# Patient Record
Sex: FEMALE | Race: WHITE | Employment: UNEMPLOYED | URBAN - METROPOLITAN AREA
[De-identification: names, ages, dates, MRNs, and addresses within clinical notes are randomized per-mention and may not be internally consistent; named-entity substitution may affect disease eponyms.]

---

## 2017-04-21 ENCOUNTER — OFFICE VISIT (OUTPATIENT)
Dept: INTERNAL MEDICINE CLINIC | Facility: CLINIC | Age: 57
End: 2017-04-21

## 2017-04-21 VITALS
SYSTOLIC BLOOD PRESSURE: 126 MMHG | HEART RATE: 86 BPM | HEIGHT: 61.25 IN | DIASTOLIC BLOOD PRESSURE: 80 MMHG | TEMPERATURE: 98 F | BODY MASS INDEX: 40.89 KG/M2 | OXYGEN SATURATION: 99 % | RESPIRATION RATE: 16 BRPM | WEIGHT: 219.38 LBS

## 2017-04-21 DIAGNOSIS — Z12.11 COLON CANCER SCREENING: ICD-10-CM

## 2017-04-21 DIAGNOSIS — Z00.00 ROUTINE GENERAL MEDICAL EXAMINATION AT A HEALTH CARE FACILITY: Primary | ICD-10-CM

## 2017-04-21 DIAGNOSIS — Z12.39 BREAST CANCER SCREENING: ICD-10-CM

## 2017-04-21 PROCEDURE — 99396 PREV VISIT EST AGE 40-64: CPT | Performed by: INTERNAL MEDICINE

## 2017-04-21 NOTE — PROGRESS NOTES
HPI:   Jessy Richardson is a 64year old female who presents for a complete physical exam. She had lost 20lb then re-gained it, mad at herself  Uses ww   Wt Readings from Last 6 Encounters:  04/21/17 : 219 lb 6.4 oz  12/31/15 : 221 lb 3.2 oz  06/02/15 : 220 Pulse 86  Temp(Src) 97.9 °F (36.6 °C) (Oral)  Resp 16  Ht 61.25\"  Wt 219 lb 6.4 oz  BMI 41.10 kg/m2  SpO2 99%  LMP 03/17/2017 (Exact Date)  Body mass index is 41.1 kg/(m^2).    GENERAL: well developed, well nourished,in no apparent distress  SKIN: no rashe Follow-up on file.

## 2017-04-28 ENCOUNTER — LAB ENCOUNTER (OUTPATIENT)
Dept: LAB | Age: 57
End: 2017-04-28
Attending: INTERNAL MEDICINE
Payer: COMMERCIAL

## 2017-04-28 DIAGNOSIS — Z00.00 ROUTINE GENERAL MEDICAL EXAMINATION AT A HEALTH CARE FACILITY: ICD-10-CM

## 2017-04-28 DIAGNOSIS — E78.00 HYPERCHOLESTEREMIA: Primary | ICD-10-CM

## 2017-04-28 PROCEDURE — 80061 LIPID PANEL: CPT

## 2017-04-28 PROCEDURE — 80053 COMPREHEN METABOLIC PANEL: CPT

## 2017-04-28 PROCEDURE — 36415 COLL VENOUS BLD VENIPUNCTURE: CPT

## 2017-04-28 PROCEDURE — 84443 ASSAY THYROID STIM HORMONE: CPT

## 2017-04-28 PROCEDURE — 85025 COMPLETE CBC W/AUTO DIFF WBC: CPT

## 2017-05-09 ENCOUNTER — HOSPITAL ENCOUNTER (OUTPATIENT)
Dept: MAMMOGRAPHY | Age: 57
Discharge: HOME | End: 2017-05-09
Attending: INTERNAL MEDICINE
Payer: COMMERCIAL

## 2017-05-09 DIAGNOSIS — Z12.39 BREAST CANCER SCREENING: ICD-10-CM

## 2017-05-09 PROCEDURE — 77067 SCR MAMMO BI INCL CAD: CPT | Performed by: INTERNAL MEDICINE

## 2018-05-11 ENCOUNTER — OFFICE VISIT (OUTPATIENT)
Dept: FAMILY MEDICINE CLINIC | Facility: CLINIC | Age: 58
End: 2018-05-11

## 2018-05-11 VITALS
DIASTOLIC BLOOD PRESSURE: 88 MMHG | HEART RATE: 81 BPM | SYSTOLIC BLOOD PRESSURE: 138 MMHG | HEIGHT: 62 IN | OXYGEN SATURATION: 96 % | BODY MASS INDEX: 41.07 KG/M2 | TEMPERATURE: 99 F | WEIGHT: 223.19 LBS

## 2018-05-11 DIAGNOSIS — H10.31 ACUTE CONJUNCTIVITIS OF RIGHT EYE, UNSPECIFIED ACUTE CONJUNCTIVITIS TYPE: Primary | ICD-10-CM

## 2018-05-11 PROCEDURE — 99213 OFFICE O/P EST LOW 20 MIN: CPT | Performed by: NURSE PRACTITIONER

## 2018-05-11 RX ORDER — POLYMYXIN B SULFATE AND TRIMETHOPRIM 1; 10000 MG/ML; [USP'U]/ML
1 SOLUTION OPHTHALMIC EVERY 4 HOURS
Qty: 1 BOTTLE | Refills: 0 | Status: SHIPPED | OUTPATIENT
Start: 2018-05-11 | End: 2018-05-18

## 2018-05-11 NOTE — PATIENT INSTRUCTIONS
Understanding Red Eye: Causes    Do your eyes sometimes get red and irritated? This could be a sign of irritation or infection. The inside of your eyelid and the white of your eye are covered with a membrane called the conjunctiva.  When your eye is irrit

## 2018-05-11 NOTE — PROGRESS NOTES
CHIEF COMPLAINT:   Patient presents with:  Eye Problem: right eye irritation,redness, pressure, pain x3days      HPI:   Sesar Nicholson is a 62year old female who presents with chief complaint of \"pink eye\". Symptoms began  3  days ago.  Symptoms have be Problem Relation Age of Onset   • Heart Attack Father      52's   • Heart Attack Brother 36   • Glaucoma Maternal Grandmother    • Other[Other] [OTHER] Maternal Grandmother    • Glaucoma Maternal Uncle    • High Blood Pressure Mother       Smoking status: PLAN: Hygeine and comfort care as listen in patient instructions. Irritation of eye vs. Conjunctivitis. Medication as listed below. If any vision changes or eye pain seek emergent care. Follow up with Eye Dr.  in 2-3 days if no improvement.  Advised arabella

## 2019-05-13 ENCOUNTER — OFFICE VISIT (OUTPATIENT)
Dept: INTERNAL MEDICINE CLINIC | Facility: CLINIC | Age: 59
End: 2019-05-13
Payer: COMMERCIAL

## 2019-05-13 VITALS
DIASTOLIC BLOOD PRESSURE: 80 MMHG | TEMPERATURE: 98 F | RESPIRATION RATE: 14 BRPM | HEART RATE: 87 BPM | SYSTOLIC BLOOD PRESSURE: 144 MMHG | OXYGEN SATURATION: 96 % | WEIGHT: 232.69 LBS | HEIGHT: 62 IN | BODY MASS INDEX: 42.82 KG/M2

## 2019-05-13 DIAGNOSIS — D22.9 NUMEROUS MOLES: ICD-10-CM

## 2019-05-13 DIAGNOSIS — R73.02 IGT (IMPAIRED GLUCOSE TOLERANCE): ICD-10-CM

## 2019-05-13 DIAGNOSIS — N90.89 VULVAR LESION: ICD-10-CM

## 2019-05-13 DIAGNOSIS — E78.00 PURE HYPERCHOLESTEROLEMIA: ICD-10-CM

## 2019-05-13 DIAGNOSIS — E66.01 CLASS 3 SEVERE OBESITY DUE TO EXCESS CALORIES WITHOUT SERIOUS COMORBIDITY WITH BODY MASS INDEX (BMI) OF 40.0 TO 44.9 IN ADULT (HCC): ICD-10-CM

## 2019-05-13 DIAGNOSIS — Z12.11 COLON CANCER SCREENING: ICD-10-CM

## 2019-05-13 DIAGNOSIS — E88.81 METABOLIC SYNDROME: ICD-10-CM

## 2019-05-13 DIAGNOSIS — Z00.00 ROUTINE GENERAL MEDICAL EXAMINATION AT A HEALTH CARE FACILITY: Primary | ICD-10-CM

## 2019-05-13 DIAGNOSIS — Z12.31 ENCOUNTER FOR SCREENING MAMMOGRAM FOR BREAST CANCER: ICD-10-CM

## 2019-05-13 PROCEDURE — 99213 OFFICE O/P EST LOW 20 MIN: CPT | Performed by: INTERNAL MEDICINE

## 2019-05-13 PROCEDURE — 99396 PREV VISIT EST AGE 40-64: CPT | Performed by: INTERNAL MEDICINE

## 2019-05-13 NOTE — PROGRESS NOTES
HPI:   Chris Roland is a 62year old female who presents for a complete physical exam.  She has gained more wt and is high risk for DM.  No PU/PD  Wt Readings from Last 6 Encounters:  05/13/19 : 232 lb 11.2 oz  05/11/18 : 223 lb 3.2 oz  04/21/17 : 219 lb depression or anxiety  HEMATOLOGIC: denies hx of anemia  ENDOCRINE: denies thyroid history  ALL/ASTHMA: denies hx of allergy or asthma    EXAM:   /80 (BP Location: Left arm, Patient Position: Sitting, Cuff Size: large)   Pulse 87   Temp 98.1 °F (36.7 one, will recheck  Igt (impaired glucose tolerance)- she is very high risk for DM, check labs  Metabolic syndrome \"  Class 3 severe obesity due to excess calories without serious comorbidity with body mass index (bmi) of 40.0 to 44.9 in adult (hcc)- refer

## 2019-05-15 ENCOUNTER — LAB ENCOUNTER (OUTPATIENT)
Dept: LAB | Age: 59
End: 2019-05-15
Attending: INTERNAL MEDICINE
Payer: COMMERCIAL

## 2019-05-15 DIAGNOSIS — Z00.00 ROUTINE GENERAL MEDICAL EXAMINATION AT A HEALTH CARE FACILITY: ICD-10-CM

## 2019-05-15 PROCEDURE — 80053 COMPREHEN METABOLIC PANEL: CPT

## 2019-05-15 PROCEDURE — 83036 HEMOGLOBIN GLYCOSYLATED A1C: CPT

## 2019-05-15 PROCEDURE — 84443 ASSAY THYROID STIM HORMONE: CPT

## 2019-05-15 PROCEDURE — 82306 VITAMIN D 25 HYDROXY: CPT

## 2019-05-15 PROCEDURE — 80061 LIPID PANEL: CPT

## 2019-05-15 PROCEDURE — 36415 COLL VENOUS BLD VENIPUNCTURE: CPT

## 2019-05-16 NOTE — PROGRESS NOTES
Spoke to pt, aware of results & recommendations. Pt voiced understanding. Vit D, DM labs & FLP ordered in 3 months.

## 2019-05-20 ENCOUNTER — HOSPITAL ENCOUNTER (OUTPATIENT)
Dept: MAMMOGRAPHY | Age: 59
Discharge: HOME | End: 2019-05-20
Attending: INTERNAL MEDICINE
Payer: COMMERCIAL

## 2019-05-20 DIAGNOSIS — Z12.31 ENCOUNTER FOR SCREENING MAMMOGRAM FOR BREAST CANCER: ICD-10-CM

## 2019-05-20 PROCEDURE — 77067 SCR MAMMO BI INCL CAD: CPT | Performed by: INTERNAL MEDICINE

## 2019-05-20 PROCEDURE — 77063 BREAST TOMOSYNTHESIS BI: CPT | Performed by: INTERNAL MEDICINE

## 2019-05-29 ENCOUNTER — OFFICE VISIT (OUTPATIENT)
Dept: OBGYN CLINIC | Facility: CLINIC | Age: 59
End: 2019-05-29
Payer: COMMERCIAL

## 2019-05-29 VITALS
WEIGHT: 229 LBS | SYSTOLIC BLOOD PRESSURE: 124 MMHG | BODY MASS INDEX: 42.14 KG/M2 | HEART RATE: 80 BPM | HEIGHT: 62 IN | DIASTOLIC BLOOD PRESSURE: 84 MMHG | TEMPERATURE: 99 F | RESPIRATION RATE: 14 BRPM

## 2019-05-29 DIAGNOSIS — N90.89 VULVAL LESION: Primary | ICD-10-CM

## 2019-05-29 DIAGNOSIS — L29.2 VULVAR ITCHING: ICD-10-CM

## 2019-05-29 PROCEDURE — 99242 OFF/OP CONSLTJ NEW/EST SF 20: CPT | Performed by: OBSTETRICS & GYNECOLOGY

## 2019-05-29 NOTE — PROGRESS NOTES
HPI:   Torsten Dudley is a 62year old female presents for consultation regarding persistent irritation and itching on the left side of her vulvar near that perianal area.   She also has a nodule on the upper part of her left vulva which she states is ir Grandmother    • High Blood Pressure Mother    • Heart Attack Brother 36   • Glaucoma Maternal Uncle         REVIEW OF SYSTEMS:   GENERAL HEALTH: feels well, denies fever/chills, lightheadedness/dizziness  SKIN: denies any unusual skin lesions or rashes  H

## 2019-06-07 ENCOUNTER — OFFICE VISIT (OUTPATIENT)
Dept: OBGYN CLINIC | Facility: CLINIC | Age: 59
End: 2019-06-07
Payer: COMMERCIAL

## 2019-06-07 VITALS
DIASTOLIC BLOOD PRESSURE: 70 MMHG | RESPIRATION RATE: 12 BRPM | WEIGHT: 226 LBS | HEIGHT: 62 IN | HEART RATE: 80 BPM | TEMPERATURE: 98 F | SYSTOLIC BLOOD PRESSURE: 110 MMHG | BODY MASS INDEX: 41.59 KG/M2

## 2019-06-07 DIAGNOSIS — N90.89 VULVAL LESION: ICD-10-CM

## 2019-06-07 PROCEDURE — 11420 EXC H-F-NK-SP B9+MARG 0.5/<: CPT | Performed by: OBSTETRICS & GYNECOLOGY

## 2019-06-07 PROCEDURE — 88304 TISSUE EXAM BY PATHOLOGIST: CPT | Performed by: OBSTETRICS & GYNECOLOGY

## 2019-06-07 NOTE — PROGRESS NOTES
Lesion Removal of the left vulva    DIAGNOSIS:   Left vulvar mass    HPI:   62year old  No LMP recorded. (Menstrual status: Menopause). with .  Here for excision of a mass on the left vulva most likely a sebaceous cyst.  She was initially also schedule

## 2019-08-16 NOTE — PROGRESS NOTES
Spoke to pt, reminded to have labs and to schedule OV. Pt states she will do in next week or 2 and will call back to schedule OV.

## 2019-08-23 NOTE — PROGRESS NOTES
Spoke to pt, reminded to have labs done & to schedule OV. Pt states will do it in the next week & will call then to schedule OV. Pt states, \"I have a lot going on right now. \"

## 2019-09-04 ENCOUNTER — APPOINTMENT (OUTPATIENT)
Dept: LAB | Age: 59
End: 2019-09-04
Attending: INTERNAL MEDICINE
Payer: COMMERCIAL

## 2019-09-04 DIAGNOSIS — Z00.00 ROUTINE GENERAL MEDICAL EXAMINATION AT A HEALTH CARE FACILITY: ICD-10-CM

## 2019-09-04 DIAGNOSIS — E55.9 VITAMIN D DEFICIENCY: ICD-10-CM

## 2019-09-04 DIAGNOSIS — E78.00 PURE HYPERCHOLESTEROLEMIA: ICD-10-CM

## 2019-09-04 LAB
ALBUMIN SERPL-MCNC: 3.5 G/DL (ref 3.4–5)
ALBUMIN/GLOB SERPL: 0.9 {RATIO} (ref 1–2)
ALP LIVER SERPL-CCNC: 98 U/L (ref 46–118)
ALT SERPL-CCNC: 24 U/L (ref 13–56)
ANION GAP SERPL CALC-SCNC: 7 MMOL/L (ref 0–18)
AST SERPL-CCNC: 18 U/L (ref 15–37)
BILIRUB SERPL-MCNC: 0.8 MG/DL (ref 0.1–2)
BUN BLD-MCNC: 13 MG/DL (ref 7–18)
BUN/CREAT SERPL: 21 (ref 10–20)
CALCIUM BLD-MCNC: 8.2 MG/DL (ref 8.5–10.1)
CHLORIDE SERPL-SCNC: 107 MMOL/L (ref 98–112)
CHOLEST SMN-MCNC: 261 MG/DL (ref ?–200)
CO2 SERPL-SCNC: 27 MMOL/L (ref 21–32)
CREAT BLD-MCNC: 0.62 MG/DL (ref 0.55–1.02)
CREAT UR-SCNC: 105 MG/DL
EST. AVERAGE GLUCOSE BLD GHB EST-MCNC: 131 MG/DL (ref 68–126)
GLOBULIN PLAS-MCNC: 4 G/DL (ref 2.8–4.4)
GLUCOSE BLD-MCNC: 110 MG/DL (ref 70–99)
HBA1C MFR BLD HPLC: 6.2 % (ref ?–5.7)
HDLC SERPL-MCNC: 57 MG/DL (ref 40–59)
LDLC SERPL CALC-MCNC: 174 MG/DL (ref ?–100)
M PROTEIN MFR SERPL ELPH: 7.5 G/DL (ref 6.4–8.2)
MICROALBUMIN UR-MCNC: 1.31 MG/DL
MICROALBUMIN/CREAT 24H UR-RTO: 12.5 UG/MG (ref ?–30)
NONHDLC SERPL-MCNC: 204 MG/DL (ref ?–130)
OSMOLALITY SERPL CALC.SUM OF ELEC: 293 MOSM/KG (ref 275–295)
POTASSIUM SERPL-SCNC: 4.1 MMOL/L (ref 3.5–5.1)
SODIUM SERPL-SCNC: 141 MMOL/L (ref 136–145)
TRIGL SERPL-MCNC: 151 MG/DL (ref 30–149)
VIT D+METAB SERPL-MCNC: 15.4 NG/ML (ref 30–100)
VLDLC SERPL CALC-MCNC: 30 MG/DL (ref 0–30)

## 2019-09-04 PROCEDURE — 82043 UR ALBUMIN QUANTITATIVE: CPT

## 2019-09-04 PROCEDURE — 83036 HEMOGLOBIN GLYCOSYLATED A1C: CPT

## 2019-09-04 PROCEDURE — 82306 VITAMIN D 25 HYDROXY: CPT

## 2019-09-04 PROCEDURE — 82570 ASSAY OF URINE CREATININE: CPT

## 2019-09-04 PROCEDURE — 36415 COLL VENOUS BLD VENIPUNCTURE: CPT

## 2019-09-04 PROCEDURE — 80061 LIPID PANEL: CPT

## 2019-09-04 PROCEDURE — 80053 COMPREHEN METABOLIC PANEL: CPT

## 2019-09-09 PROBLEM — Z12.11 SPECIAL SCREENING FOR MALIGNANT NEOPLASMS, COLON: Status: ACTIVE | Noted: 2019-09-09

## 2019-09-09 PROBLEM — K63.5 COLON POLYP: Status: ACTIVE | Noted: 2019-09-09

## 2019-09-12 ENCOUNTER — OFFICE VISIT (OUTPATIENT)
Dept: INTERNAL MEDICINE CLINIC | Facility: CLINIC | Age: 59
End: 2019-09-12
Payer: COMMERCIAL

## 2019-09-12 VITALS
HEART RATE: 86 BPM | HEIGHT: 62 IN | TEMPERATURE: 98 F | WEIGHT: 222.88 LBS | DIASTOLIC BLOOD PRESSURE: 78 MMHG | OXYGEN SATURATION: 97 % | RESPIRATION RATE: 16 BRPM | BODY MASS INDEX: 41.02 KG/M2 | SYSTOLIC BLOOD PRESSURE: 142 MMHG

## 2019-09-12 DIAGNOSIS — E78.00 PURE HYPERCHOLESTEROLEMIA: ICD-10-CM

## 2019-09-12 DIAGNOSIS — N95.0 PMB (POSTMENOPAUSAL BLEEDING): ICD-10-CM

## 2019-09-12 DIAGNOSIS — G47.33 OBSTRUCTIVE SLEEP APNEA SYNDROME: ICD-10-CM

## 2019-09-12 DIAGNOSIS — R73.02 IGT (IMPAIRED GLUCOSE TOLERANCE): Primary | ICD-10-CM

## 2019-09-12 PROCEDURE — 99214 OFFICE O/P EST MOD 30 MIN: CPT | Performed by: INTERNAL MEDICINE

## 2019-09-12 RX ORDER — ATORVASTATIN CALCIUM 20 MG/1
20 TABLET, FILM COATED ORAL DAILY
Qty: 90 TABLET | Refills: 0 | Status: SHIPPED | OUTPATIENT
Start: 2019-09-12 | End: 2019-12-11

## 2019-09-12 RX ORDER — CHOLECALCIFEROL (VITAMIN D3) 125 MCG
1 CAPSULE ORAL DAILY
COMMUNITY
End: 2020-11-11 | Stop reason: DRUGHIGH

## 2019-09-12 NOTE — PROGRESS NOTES
Isaias Melchor is a 62year old female  Patient presents with:  Lab Results  Dizziness: Some since Colonoscopy on 9/9/2019  Sleep Problem      HPI:   She is here for IGT, hyperipidemia- she eliminated some white carbs   She repeated her labs last week placed or performed in visit on 09/04/19   MICROALB/CREAT RATIO, RANDOM URINE   Result Value Ref Range    Microalbumin, Urine 1.31 mg/dL    Creatinine Ur Random 105.00 mg/dL    Malb/Cre Calc 12.5 <=30.0 ug/mg   HEMOGLOBIN A1C   Result Value Ref Range    Hg Refills for this Visit:  Requested Prescriptions     Signed Prescriptions Disp Refills   • atorvastatin 20 MG Oral Tab 90 tablet 0     Sig: Take 1 tablet (20 mg total) by mouth daily.        Imaging & Consults:  OP REFERRAL TO DIAGNOSTIC SLEEP STUDY  OBG -

## 2019-09-12 NOTE — PATIENT INSTRUCTIONS
New medicine for Cholesterol: Statins: (pravastatin, rosovastatin, simvastatin, atorvastatin) please notify me if you experience any abd bloating, discomfort , tenderness  or loss of appetite. Please call if stools pale (jany colored) or urine dark.   Notif

## 2019-11-18 ENCOUNTER — IMMUNIZATION (OUTPATIENT)
Dept: INTERNAL MEDICINE CLINIC | Facility: CLINIC | Age: 59
End: 2019-11-18
Payer: COMMERCIAL

## 2019-11-18 DIAGNOSIS — Z23 NEED FOR VACCINATION: ICD-10-CM

## 2019-11-18 PROCEDURE — 90471 IMMUNIZATION ADMIN: CPT | Performed by: INTERNAL MEDICINE

## 2019-11-18 PROCEDURE — 90686 IIV4 VACC NO PRSV 0.5 ML IM: CPT | Performed by: INTERNAL MEDICINE

## 2019-12-06 NOTE — PROGRESS NOTES
Spoke to pt's spouse on HIPAA. Made aware of results & recommendations. Repeat labs ordered in 6 months. Spouse unaware if pt has scheduled sleep study yet. Advised to have pt call back for update. Spouse voiced understanding.

## 2019-12-11 DIAGNOSIS — E78.00 PURE HYPERCHOLESTEROLEMIA: Primary | ICD-10-CM

## 2019-12-12 RX ORDER — ATORVASTATIN CALCIUM 20 MG/1
20 TABLET, FILM COATED ORAL DAILY
Qty: 90 TABLET | Refills: 1 | Status: SHIPPED | OUTPATIENT
Start: 2019-12-12 | End: 2020-03-13

## 2019-12-12 NOTE — TELEPHONE ENCOUNTER
Per OV note 9/12/19, patient to F/U 3 months. Patient had labs drawn 12/4/19, and per result note, patient to F/U in 6 months. 6 month supply of atorvastatin sent to pharmacy until labs and follow up due.

## 2019-12-16 ENCOUNTER — OFFICE VISIT (OUTPATIENT)
Dept: INTERNAL MEDICINE CLINIC | Facility: CLINIC | Age: 59
End: 2019-12-16
Payer: COMMERCIAL

## 2019-12-16 ENCOUNTER — TELEPHONE (OUTPATIENT)
Dept: INTERNAL MEDICINE CLINIC | Facility: CLINIC | Age: 59
End: 2019-12-16

## 2019-12-16 ENCOUNTER — HOSPITAL ENCOUNTER (OUTPATIENT)
Dept: GENERAL RADIOLOGY | Age: 59
Discharge: HOME | End: 2019-12-16
Attending: INTERNAL MEDICINE
Payer: COMMERCIAL

## 2019-12-16 VITALS
TEMPERATURE: 98 F | WEIGHT: 218.5 LBS | SYSTOLIC BLOOD PRESSURE: 136 MMHG | HEART RATE: 81 BPM | HEIGHT: 62 IN | OXYGEN SATURATION: 98 % | RESPIRATION RATE: 16 BRPM | DIASTOLIC BLOOD PRESSURE: 82 MMHG | BODY MASS INDEX: 40.21 KG/M2

## 2019-12-16 DIAGNOSIS — R04.2 HEMOPTYSIS: ICD-10-CM

## 2019-12-16 DIAGNOSIS — J20.9 ACUTE BRONCHITIS WITH BRONCHOSPASM: Primary | ICD-10-CM

## 2019-12-16 DIAGNOSIS — J20.9 ACUTE BRONCHITIS WITH BRONCHOSPASM: ICD-10-CM

## 2019-12-16 PROCEDURE — 71046 X-RAY EXAM CHEST 2 VIEWS: CPT | Performed by: INTERNAL MEDICINE

## 2019-12-16 PROCEDURE — 99214 OFFICE O/P EST MOD 30 MIN: CPT | Performed by: INTERNAL MEDICINE

## 2019-12-16 RX ORDER — BUDESONIDE AND FORMOTEROL FUMARATE DIHYDRATE 160; 4.5 UG/1; UG/1
2 AEROSOL RESPIRATORY (INHALATION) 2 TIMES DAILY
Qty: 1 INHALER | Refills: 0 | COMMUNITY
Start: 2019-12-16 | End: 2020-10-30 | Stop reason: ALTCHOICE

## 2019-12-16 RX ORDER — ACETAMINOPHEN 500 MG
1000 TABLET ORAL EVERY 6 HOURS PRN
COMMUNITY
End: 2019-12-23

## 2019-12-16 RX ORDER — AZITHROMYCIN 250 MG/1
TABLET, FILM COATED ORAL
Qty: 6 TABLET | Refills: 0 | Status: SHIPPED | OUTPATIENT
Start: 2019-12-16 | End: 2019-12-23 | Stop reason: ALTCHOICE

## 2019-12-16 NOTE — TELEPHONE ENCOUNTER
Spoke to pt. Pt states nose has been running since Saturday. Then coughing up mucus, clear to darker as day went on. Took Benadryl to help with sneezing & runny nose. Sunday runny nose, coughing again, red spots in it  Denies any bloody nose.     States

## 2019-12-16 NOTE — PROGRESS NOTES
Karri Cochran is a 62year old female  Patient presents with:  Cough  Chest Congestion: Clear to Yellow Mucus - yesterday/today she noticed \"Red\" spots in mucus   Wheezing: \"Rumbling\" noise  Nasal Congestion      HPI:   Cough for 3 days worsening 1 w Colon polyp        REVIEW OF SYSTEMS:   GENERAL HEALTH: feels well otherwise      EXAM:   /82 (BP Location: Left arm, Patient Position: Sitting, Cuff Size: large)   Pulse 81   Temp 97.9 °F (36.6 °C) (Oral)   Resp 16   Ht 62\"   Wt 218 lb 8 oz (99.1

## 2019-12-16 NOTE — TELEPHONE ENCOUNTER
Patient called, last week she started to get a cold and cough. The patient stated to cough up a lot of mucus this weekend, and then she noticed there was \"red\" in it, which she thinks could be blood?   The patient is very winded as well, wanted apt today

## 2019-12-23 ENCOUNTER — OFFICE VISIT (OUTPATIENT)
Dept: INTERNAL MEDICINE CLINIC | Facility: CLINIC | Age: 59
End: 2019-12-23
Payer: COMMERCIAL

## 2019-12-23 VITALS
BODY MASS INDEX: 40.12 KG/M2 | SYSTOLIC BLOOD PRESSURE: 122 MMHG | TEMPERATURE: 98 F | DIASTOLIC BLOOD PRESSURE: 80 MMHG | HEIGHT: 62 IN | WEIGHT: 218 LBS | OXYGEN SATURATION: 98 % | HEART RATE: 80 BPM | RESPIRATION RATE: 14 BRPM

## 2019-12-23 DIAGNOSIS — R04.2 HEMOPTYSIS: ICD-10-CM

## 2019-12-23 DIAGNOSIS — B00.1 RECURRENT HERPES LABIALIS: ICD-10-CM

## 2019-12-23 DIAGNOSIS — J20.9 ACUTE BRONCHITIS WITH BRONCHOSPASM: Primary | ICD-10-CM

## 2019-12-23 PROCEDURE — 99213 OFFICE O/P EST LOW 20 MIN: CPT | Performed by: INTERNAL MEDICINE

## 2019-12-23 RX ORDER — VALACYCLOVIR HYDROCHLORIDE 1 G/1
2 TABLET, FILM COATED ORAL EVERY 12 HOURS SCHEDULED
Qty: 4 TABLET | Refills: 0 | Status: SHIPPED | OUTPATIENT
Start: 2019-12-23 | End: 2019-12-24

## 2019-12-23 NOTE — PROGRESS NOTES
Stefany Zendejas is a 62year old female. To F/U from last visit regarding acute bronchitis w/bronchospasm and hemoptosis    HPI:    Interim history:she is feeling so much better.  No further hemoptysis  She is only coughing a tiny pit  No wheezing or dyspne auscultation and percussion      Xr Chest Pa + Lat Chest (cpt=71046)    Result Date: 12/16/2019  PROCEDURE:  XR CHEST PA + LAT CHEST (CPT=71046)  INDICATIONS:  R04.2 Hemoptysis J20.9 Acute bronchitis, unspecified  COMPARISON:  None.   TECHNIQUE:  PA and lat

## 2020-06-05 DIAGNOSIS — E78.00 PURE HYPERCHOLESTEROLEMIA: ICD-10-CM

## 2020-06-05 RX ORDER — ATORVASTATIN CALCIUM 20 MG/1
TABLET, FILM COATED ORAL
Qty: 90 TABLET | Refills: 0 | Status: SHIPPED | OUTPATIENT
Start: 2020-06-05 | End: 2020-09-11

## 2020-08-27 ENCOUNTER — TELEPHONE (OUTPATIENT)
Dept: INTERNAL MEDICINE CLINIC | Facility: CLINIC | Age: 60
End: 2020-08-27

## 2020-08-27 DIAGNOSIS — Z12.31 ENCOUNTER FOR SCREENING MAMMOGRAM FOR BREAST CANCER: Primary | ICD-10-CM

## 2020-08-27 NOTE — TELEPHONE ENCOUNTER
Pt has a PE scheduled for 10/30 and Pt was requesting that she get an order for a Mammogram to be done before her appt.   Please advise  Thank you

## 2020-09-08 ENCOUNTER — HOSPITAL ENCOUNTER (OUTPATIENT)
Dept: MAMMOGRAPHY | Age: 60
Discharge: HOME | End: 2020-09-08
Attending: INTERNAL MEDICINE
Payer: COMMERCIAL

## 2020-09-08 DIAGNOSIS — Z12.31 ENCOUNTER FOR SCREENING MAMMOGRAM FOR BREAST CANCER: ICD-10-CM

## 2020-09-08 PROCEDURE — 77067 SCR MAMMO BI INCL CAD: CPT | Performed by: INTERNAL MEDICINE

## 2020-09-08 PROCEDURE — 77063 BREAST TOMOSYNTHESIS BI: CPT | Performed by: INTERNAL MEDICINE

## 2020-09-10 DIAGNOSIS — E78.00 PURE HYPERCHOLESTEROLEMIA: ICD-10-CM

## 2020-09-11 RX ORDER — ATORVASTATIN CALCIUM 20 MG/1
TABLET, FILM COATED ORAL
Qty: 90 TABLET | Refills: 0 | Status: SHIPPED | OUTPATIENT
Start: 2020-09-11 | End: 2020-12-15

## 2020-10-30 ENCOUNTER — OFFICE VISIT (OUTPATIENT)
Dept: INTERNAL MEDICINE CLINIC | Facility: CLINIC | Age: 60
End: 2020-10-30
Payer: COMMERCIAL

## 2020-10-30 VITALS
BODY MASS INDEX: 43.08 KG/M2 | HEART RATE: 90 BPM | TEMPERATURE: 97 F | HEIGHT: 62 IN | DIASTOLIC BLOOD PRESSURE: 70 MMHG | SYSTOLIC BLOOD PRESSURE: 122 MMHG | RESPIRATION RATE: 16 BRPM | WEIGHT: 234.13 LBS | OXYGEN SATURATION: 98 %

## 2020-10-30 DIAGNOSIS — Z12.31 BREAST CANCER SCREENING BY MAMMOGRAM: Primary | ICD-10-CM

## 2020-10-30 DIAGNOSIS — Z12.4 CERVICAL CANCER SCREENING: ICD-10-CM

## 2020-10-30 DIAGNOSIS — Z23 NEED FOR VACCINATION: ICD-10-CM

## 2020-10-30 DIAGNOSIS — E66.01 CLASS 3 SEVERE OBESITY DUE TO EXCESS CALORIES WITH SERIOUS COMORBIDITY AND BODY MASS INDEX (BMI) OF 40.0 TO 44.9 IN ADULT (HCC): ICD-10-CM

## 2020-10-30 DIAGNOSIS — E78.00 PURE HYPERCHOLESTEROLEMIA: ICD-10-CM

## 2020-10-30 DIAGNOSIS — G47.33 OBSTRUCTIVE SLEEP APNEA SYNDROME: ICD-10-CM

## 2020-10-30 DIAGNOSIS — R73.02 IGT (IMPAIRED GLUCOSE TOLERANCE): ICD-10-CM

## 2020-10-30 DIAGNOSIS — E88.81 METABOLIC SYNDROME: ICD-10-CM

## 2020-10-30 DIAGNOSIS — Z00.00 ROUTINE GENERAL MEDICAL EXAMINATION AT A HEALTH CARE FACILITY: ICD-10-CM

## 2020-10-30 PROBLEM — L29.2 VULVAR ITCHING: Status: RESOLVED | Noted: 2019-05-29 | Resolved: 2020-10-30

## 2020-10-30 PROBLEM — N90.89 VULVAL LESION: Status: RESOLVED | Noted: 2019-05-29 | Resolved: 2020-10-30

## 2020-10-30 PROCEDURE — 87624 HPV HI-RISK TYP POOLED RSLT: CPT | Performed by: INTERNAL MEDICINE

## 2020-10-30 PROCEDURE — 3078F DIAST BP <80 MM HG: CPT | Performed by: INTERNAL MEDICINE

## 2020-10-30 PROCEDURE — 3074F SYST BP LT 130 MM HG: CPT | Performed by: INTERNAL MEDICINE

## 2020-10-30 PROCEDURE — 3008F BODY MASS INDEX DOCD: CPT | Performed by: INTERNAL MEDICINE

## 2020-10-30 PROCEDURE — 88175 CYTOPATH C/V AUTO FLUID REDO: CPT | Performed by: INTERNAL MEDICINE

## 2020-10-30 PROCEDURE — 99214 OFFICE O/P EST MOD 30 MIN: CPT | Performed by: INTERNAL MEDICINE

## 2020-10-30 PROCEDURE — 90471 IMMUNIZATION ADMIN: CPT | Performed by: INTERNAL MEDICINE

## 2020-10-30 PROCEDURE — 99396 PREV VISIT EST AGE 40-64: CPT | Performed by: INTERNAL MEDICINE

## 2020-10-30 PROCEDURE — 90750 HZV VACC RECOMBINANT IM: CPT | Performed by: INTERNAL MEDICINE

## 2020-10-30 NOTE — PROGRESS NOTES
HPI:   Tiffany Alexander is a 61year old female who presents for a complete physical exam.  She is here for IGT, hyperipidemia-     Statin:denies jaundice, pale stools, dark urine, weight change, fatigue, abd pain, distention or DALE       She has very low D Alcohol/week: 0.0 - 0.8 standard drinks      Comment: Not often    Drug use: Never    Occ: homemaker. : y.  Children: 1 ,son at [employer] at home,   , Exercise: none currently but would like to return     REVIEW OF SYSTEMS:   GENERAL: feels well otherwi AND PLAN:   Marti Garcia is a 61year old female who presents for a complete physical exam.  breast and pelvic done. Order put in for mammogram .  Appropriate lab testing ordered, instructions given for healthy living as indicated.  The patient indicates

## 2020-11-11 DIAGNOSIS — R73.09 ELEVATED HEMOGLOBIN A1C: ICD-10-CM

## 2020-11-11 DIAGNOSIS — E55.9 VITAMIN D DEFICIENCY: Primary | ICD-10-CM

## 2020-11-11 DIAGNOSIS — R73.09 ELEVATED GLUCOSE: ICD-10-CM

## 2020-11-11 RX ORDER — GLUCOSAMINE HCL 500 MG
1 TABLET ORAL DAILY
COMMUNITY
Start: 2020-11-11

## 2020-12-15 DIAGNOSIS — E78.00 PURE HYPERCHOLESTEROLEMIA: ICD-10-CM

## 2020-12-15 RX ORDER — ATORVASTATIN CALCIUM 20 MG/1
20 TABLET, FILM COATED ORAL DAILY
Qty: 90 TABLET | Refills: 0 | Status: SHIPPED | OUTPATIENT
Start: 2020-12-15 | End: 2021-03-22

## 2021-01-27 ENCOUNTER — TELEPHONE (OUTPATIENT)
Dept: INTERNAL MEDICINE CLINIC | Facility: CLINIC | Age: 61
End: 2021-01-27

## 2021-01-27 DIAGNOSIS — Z23 NEED FOR SHINGLES VACCINE: Primary | ICD-10-CM

## 2021-01-27 NOTE — TELEPHONE ENCOUNTER
2nd Shingles vaccine pended.
Patient scheduled her 2nd shingrix dose for February 2nd. Her first one was on 10/30/20. Please place the order. Thank you!
Signed.
Unknown

## 2021-02-02 ENCOUNTER — NURSE ONLY (OUTPATIENT)
Dept: INTERNAL MEDICINE CLINIC | Facility: CLINIC | Age: 61
End: 2021-02-02
Payer: COMMERCIAL

## 2021-02-02 PROCEDURE — 90471 IMMUNIZATION ADMIN: CPT | Performed by: NURSE PRACTITIONER

## 2021-02-02 PROCEDURE — 90750 HZV VACC RECOMBINANT IM: CPT | Performed by: NURSE PRACTITIONER

## 2021-02-19 ENCOUNTER — PATIENT MESSAGE (OUTPATIENT)
Dept: INTERNAL MEDICINE CLINIC | Facility: CLINIC | Age: 61
End: 2021-02-19

## 2021-02-19 NOTE — TELEPHONE ENCOUNTER
Patient is asking if it is acceptable to take vitamin B complex with atorvastatin? Thank you. Rest of questions will be answered in response back to patient.

## 2021-02-19 NOTE — TELEPHONE ENCOUNTER
From: Mya Owens  To: Quin Claude, MD  Sent: 2/19/2021 11:17 AM CST  Subject: Non-Urgent Medical Question    Dr. Cj Tovar  I have a couple questions   1. I started taking a supplement about a month ago.  It’s nature made super B-complex

## 2021-03-09 ENCOUNTER — TELEPHONE (OUTPATIENT)
Dept: INTERNAL MEDICINE CLINIC | Facility: CLINIC | Age: 61
End: 2021-03-09

## 2021-03-09 LAB
GLUCOSE: 112 MG/DL (ref 65–99)
HEMOGLOBIN A1C: 6.8 % OF TOTAL HGB
VITAMIN D, 1,25 (OH)2,$TOTAL: 53 PG/ML (ref 18–72)
VITAMIN D2, 1,25 (OH)2: <8 PG/ML
VITAMIN D3, 1,25 (OH)2: 53 PG/ML

## 2021-03-09 NOTE — TELEPHONE ENCOUNTER
A1c and glucose already in epic and resulted. Was hoping for the vitamin D. Called [facility] who said was sent out to reference lab but lab does have it. Could not give an estimated completion date.

## 2021-03-09 NOTE — TELEPHONE ENCOUNTER
Incoming (mail or fax):  fax  Received from:  [facility]  Documentation given to:  Triage - Dr Emilee Holstein bin

## 2021-03-09 NOTE — TELEPHONE ENCOUNTER
PSR - I discussed labs with pt today. She will need an extended visit to discuss labs - either with Vinnie Chambers or dr Teri Walls. {t was not able to schedule at time of our call. Please follow up with her tomorrow - extended visit. Thanks!

## 2021-03-16 ENCOUNTER — OFFICE VISIT (OUTPATIENT)
Dept: INTERNAL MEDICINE CLINIC | Facility: CLINIC | Age: 61
End: 2021-03-16
Payer: COMMERCIAL

## 2021-03-16 VITALS
WEIGHT: 233.81 LBS | HEART RATE: 87 BPM | DIASTOLIC BLOOD PRESSURE: 66 MMHG | SYSTOLIC BLOOD PRESSURE: 122 MMHG | HEIGHT: 62 IN | OXYGEN SATURATION: 98 % | BODY MASS INDEX: 43.02 KG/M2 | RESPIRATION RATE: 16 BRPM | TEMPERATURE: 96 F

## 2021-03-16 DIAGNOSIS — E11.9 TYPE 2 DIABETES MELLITUS WITHOUT COMPLICATION, WITHOUT LONG-TERM CURRENT USE OF INSULIN (HCC): Primary | ICD-10-CM

## 2021-03-16 DIAGNOSIS — E78.00 PURE HYPERCHOLESTEROLEMIA: ICD-10-CM

## 2021-03-16 PROCEDURE — 99215 OFFICE O/P EST HI 40 MIN: CPT | Performed by: NURSE PRACTITIONER

## 2021-03-16 PROCEDURE — 3078F DIAST BP <80 MM HG: CPT | Performed by: NURSE PRACTITIONER

## 2021-03-16 PROCEDURE — 3074F SYST BP LT 130 MM HG: CPT | Performed by: NURSE PRACTITIONER

## 2021-03-16 PROCEDURE — 90732 PPSV23 VACC 2 YRS+ SUBQ/IM: CPT | Performed by: NURSE PRACTITIONER

## 2021-03-16 PROCEDURE — 90471 IMMUNIZATION ADMIN: CPT | Performed by: NURSE PRACTITIONER

## 2021-03-16 PROCEDURE — 3008F BODY MASS INDEX DOCD: CPT | Performed by: NURSE PRACTITIONER

## 2021-03-16 RX ORDER — BLOOD SUGAR DIAGNOSTIC
STRIP MISCELLANEOUS
Qty: 100 STRIP | Refills: 3 | Status: SHIPPED | OUTPATIENT
Start: 2021-03-16 | End: 2021-06-11

## 2021-03-16 RX ORDER — LANCETS
1 EACH MISCELLANEOUS DAILY
Qty: 1 BOX | Refills: 0 | Status: SHIPPED | OUTPATIENT
Start: 2021-03-16 | End: 2021-03-17 | Stop reason: ALTCHOICE

## 2021-03-16 NOTE — PROGRESS NOTES
Karri Cochran is a 61year old female. CHIEF COMPLAINT   New onset DM, lab review    HPI:   Has had prediabetes. Last year was not doing well with diet and exercise. A1c is now 6.8. No numbness or tingling.  No excessive thirst, always urinates frequentl after URIs   • Rib pain on left side     under rib   • Sprained ankle    • Viral URI with cough 10/15/1997    resolving   • Wears glasses       Social History:  Social History    Tobacco Use      Smoking status: Never Smoker      Smokeless tobacco: Never U BILT 1.0 11/09/2020    TP 7.0 11/09/2020    ALB 4.1 11/09/2020    GLOBULT 2.9 11/09/2020    GLOBULIN 4.0 09/04/2019    ALBGLOBRAT 1.4 11/09/2020     11/09/2020    K 4.3 11/09/2020     11/09/2020    CO2 29 11/09/2020      Lab Results   Component

## 2021-03-16 NOTE — PATIENT INSTRUCTIONS
Get your labs done in 3 months. You should be fasting for at least 10 hours. If you take a multivitamin with Biotin or any biotin product it should be held for 3 days prior to getting your labs done.     Get your eye exam done    Start a healthy low sugar/c diabetes medicine exactly as told to. · Watch your blood sugar as you are told to. Keep a log of your results. This will help your provider change your medicines to keep your blood sugar under control. · Try to reach your ideal weight.  You may be able to change your insulin dose. This will depend on your blood sugar results. · Check your blood sugar every 2 to 4 hours, or at least 4 times a day. · Check your ketones often. Watch them more often if you are vomiting and having diarrhea.   · Don't skip meals healthcare provider right away if you have any of these symptoms of high blood sugar that don't go away with the above treatment suggestions:  · Urinating often  · Drowsiness  · Thirst  · Headache  · Nausea or vomiting  · Belly (abdominal) pain  · Eyesight beats. You will see your blood pressure readings written together. For example, a person with a systolic pressure of 988 and a diastolic pressure of 78 will have 118/78 written in the medical record.    Blood pressure is categorized as normal, elevated, or with your high blood pressure and your high blood pressure medicines. · Stimulants such as amphetamine or cocaine could be lethal for someone with high blood pressure. Never take these. · Limit how much caffeine you get in your diet.  Switch to caffeine-f blood pressure monitor has a built-in memory, simply take the monitor with you to your next appointment. · Call your provider if you have several high readings.  Don't be frightened by a single high blood pressure reading, but if you get several high readi

## 2021-03-17 DIAGNOSIS — E11.9 TYPE 2 DIABETES MELLITUS WITHOUT COMPLICATION, WITHOUT LONG-TERM CURRENT USE OF INSULIN (HCC): Primary | ICD-10-CM

## 2021-03-17 RX ORDER — BLOOD-GLUCOSE METER
EACH MISCELLANEOUS
Qty: 1 KIT | Refills: 0 | Status: SHIPPED | OUTPATIENT
Start: 2021-03-17

## 2021-03-17 RX ORDER — LANCETS
1 EACH MISCELLANEOUS DAILY
Qty: 1 BOX | Refills: 0 | Status: SHIPPED | OUTPATIENT
Start: 2021-03-17 | End: 2021-06-11

## 2021-03-17 NOTE — TELEPHONE ENCOUNTER
Incoming (mail or fax): Fax  Received from: [facility]  Documentation given to: Triage    Received faxes requesting alternative for blood glucose monitor kit as ACCU-CHEK COMPACT PLUS CARE is not covered.   Recommended ACCU-CHEK GUIDE  Also request for FASTC

## 2021-03-22 ENCOUNTER — TELEPHONE (OUTPATIENT)
Dept: INTERNAL MEDICINE CLINIC | Facility: CLINIC | Age: 61
End: 2021-03-22

## 2021-03-22 DIAGNOSIS — E78.00 PURE HYPERCHOLESTEROLEMIA: ICD-10-CM

## 2021-03-22 RX ORDER — ATORVASTATIN CALCIUM 20 MG/1
20 TABLET, FILM COATED ORAL DAILY
Qty: 90 TABLET | Refills: 0 | Status: SHIPPED | OUTPATIENT
Start: 2021-03-22 | End: 2021-07-02

## 2021-03-22 NOTE — TELEPHONE ENCOUNTER
Pt having dm neuropothy test would like to know if it matters what time of day she has it or if it should be before/after a meal. Please advise.  Thank you

## 2021-03-23 NOTE — TELEPHONE ENCOUNTER
DM NEPHROPATHY SCREENING. Noted Microalb/Cr Ratio urine test ordered to [facility].  Noted pt also has FLP, A1C, & CMP ordered.   Advised pt via [facility] to fast

## 2021-03-24 ENCOUNTER — PATIENT MESSAGE (OUTPATIENT)
Dept: INTERNAL MEDICINE CLINIC | Facility: CLINIC | Age: 61
End: 2021-03-24

## 2021-03-24 NOTE — TELEPHONE ENCOUNTER
From: Brain Moritz  To:  Disha Mitchell MD  Sent: 3/24/2021 12:41 PM CDT  Subject: Visit Follow-up Question    Regarding: Diabetic Nephropathy Screening Test  I understand this is the Microalbumin/Creatinine Ratio Urine Test.  This lab order was

## 2021-03-28 ENCOUNTER — PATIENT MESSAGE (OUTPATIENT)
Dept: TELEHEALTH | Age: 61
End: 2021-03-28

## 2021-03-28 DIAGNOSIS — E78.00 PURE HYPERCHOLESTEROLEMIA: ICD-10-CM

## 2021-03-31 ENCOUNTER — IMMUNIZATION (OUTPATIENT)
Dept: LAB | Facility: HOSPITAL | Age: 61
End: 2021-03-31
Attending: HOSPITALIST
Payer: COMMERCIAL

## 2021-03-31 DIAGNOSIS — Z23 NEED FOR VACCINATION: Primary | ICD-10-CM

## 2021-03-31 PROCEDURE — 0011A SARSCOV2 VAC 100MCG/0.5ML IM: CPT | Performed by: PHYSICIAN ASSISTANT

## 2021-04-09 ENCOUNTER — TELEPHONE (OUTPATIENT)
Dept: INTERNAL MEDICINE CLINIC | Facility: CLINIC | Age: 61
End: 2021-04-09

## 2021-04-09 NOTE — TELEPHONE ENCOUNTER
Received 2021 DM Eye Exam from Dr. Verneita Lennox @ [facility]. Updated in Epic and Sent to Scan.

## 2021-04-09 NOTE — TELEPHONE ENCOUNTER
Incoming (mail or fax):  fax  Received from:  [facility]  Documentation given to:  [facility]

## 2021-04-12 ENCOUNTER — MED REC SCAN ONLY (OUTPATIENT)
Dept: INTERNAL MEDICINE CLINIC | Facility: CLINIC | Age: 61
End: 2021-04-12

## 2021-04-28 ENCOUNTER — IMMUNIZATION (OUTPATIENT)
Dept: LAB | Facility: HOSPITAL | Age: 61
End: 2021-04-28
Attending: PHYSICIAN ASSISTANT
Payer: COMMERCIAL

## 2021-04-28 DIAGNOSIS — Z23 NEED FOR VACCINATION: Primary | ICD-10-CM

## 2021-04-28 PROCEDURE — 0012A SARSCOV2 VAC 100MCG/0.5ML IM: CPT

## 2021-05-17 ENCOUNTER — PATIENT MESSAGE (OUTPATIENT)
Dept: INTERNAL MEDICINE CLINIC | Facility: CLINIC | Age: 61
End: 2021-05-17

## 2021-05-17 DIAGNOSIS — Z86.19 HISTORY OF COLD SORES: Primary | ICD-10-CM

## 2021-05-18 RX ORDER — VALACYCLOVIR HYDROCHLORIDE 1 G/1
TABLET, FILM COATED ORAL
Qty: 4 TABLET | Refills: 0 | Status: SHIPPED | OUTPATIENT
Start: 2021-05-18 | End: 2021-07-08 | Stop reason: ALTCHOICE

## 2021-05-18 NOTE — TELEPHONE ENCOUNTER
From: Ricky Cruz  To: Jair Fletcher MD  Sent: 5/17/2021 11:48 AM CDT  Subject: Prescription Question    Dr. Vickie Eubanks,   I'm requesting a new prescription for Valacyclovir.  I currently have a cold and if everything goes like usual I will

## 2021-06-11 DIAGNOSIS — E11.9 TYPE 2 DIABETES MELLITUS WITHOUT COMPLICATION, WITHOUT LONG-TERM CURRENT USE OF INSULIN (HCC): ICD-10-CM

## 2021-06-14 RX ORDER — LANCETS
1 EACH MISCELLANEOUS DAILY
Qty: 102 EACH | Refills: 0 | Status: SHIPPED | OUTPATIENT
Start: 2021-06-14 | End: 2021-07-08 | Stop reason: ALTCHOICE

## 2021-06-14 RX ORDER — BLOOD SUGAR DIAGNOSTIC
STRIP MISCELLANEOUS
Qty: 100 STRIP | Refills: 0 | Status: SHIPPED | OUTPATIENT
Start: 2021-06-14 | End: 2021-12-02

## 2021-06-17 PROCEDURE — 3061F NEG MICROALBUMINURIA REV: CPT | Performed by: INTERNAL MEDICINE

## 2021-06-17 PROCEDURE — 3044F HG A1C LEVEL LT 7.0%: CPT | Performed by: INTERNAL MEDICINE

## 2021-06-18 NOTE — PROGRESS NOTES
Spoke to patient, aware of results and recommendations. Patient voice understandings. Patient scheduled for 7/8/2021 at 0900 with Dr Maggie Trujillo for Diabetic check and to review lab results.

## 2021-07-02 RX ORDER — ATORVASTATIN CALCIUM 20 MG/1
20 TABLET, FILM COATED ORAL DAILY
Qty: 90 TABLET | Refills: 0 | Status: SHIPPED | OUTPATIENT
Start: 2021-07-02 | End: 2021-09-27

## 2021-07-08 ENCOUNTER — OFFICE VISIT (OUTPATIENT)
Dept: INTERNAL MEDICINE CLINIC | Facility: CLINIC | Age: 61
End: 2021-07-08
Payer: COMMERCIAL

## 2021-07-08 VITALS
RESPIRATION RATE: 14 BRPM | SYSTOLIC BLOOD PRESSURE: 120 MMHG | TEMPERATURE: 98 F | HEIGHT: 62 IN | WEIGHT: 225.13 LBS | HEART RATE: 82 BPM | BODY MASS INDEX: 41.43 KG/M2 | DIASTOLIC BLOOD PRESSURE: 72 MMHG | OXYGEN SATURATION: 96 %

## 2021-07-08 DIAGNOSIS — R35.0 FREQUENT URINATION: ICD-10-CM

## 2021-07-08 DIAGNOSIS — R20.2 RIGHT LEG PARESTHESIAS: ICD-10-CM

## 2021-07-08 DIAGNOSIS — E66.01 CLASS 3 SEVERE OBESITY DUE TO EXCESS CALORIES WITHOUT SERIOUS COMORBIDITY WITH BODY MASS INDEX (BMI) OF 40.0 TO 44.9 IN ADULT (HCC): ICD-10-CM

## 2021-07-08 DIAGNOSIS — E78.00 PURE HYPERCHOLESTEROLEMIA: ICD-10-CM

## 2021-07-08 DIAGNOSIS — G57.11 MERALGIA PARESTHETICA OF RIGHT SIDE: ICD-10-CM

## 2021-07-08 DIAGNOSIS — E11.9 TYPE 2 DIABETES MELLITUS WITHOUT COMPLICATION, WITHOUT LONG-TERM CURRENT USE OF INSULIN (HCC): Primary | ICD-10-CM

## 2021-07-08 DIAGNOSIS — E88.81 METABOLIC SYNDROME: ICD-10-CM

## 2021-07-08 LAB
BILIRUB UR QL STRIP.AUTO: NEGATIVE
COLOR UR AUTO: YELLOW
GLUCOSE UR STRIP.AUTO-MCNC: NEGATIVE MG/DL
KETONES UR STRIP.AUTO-MCNC: NEGATIVE MG/DL
LEUKOCYTE ESTERASE UR QL STRIP.AUTO: NEGATIVE
NITRITE UR QL STRIP.AUTO: NEGATIVE
PH UR STRIP.AUTO: 7 [PH] (ref 5–8)
PROT UR STRIP.AUTO-MCNC: NEGATIVE MG/DL
RBC UR QL AUTO: NEGATIVE
SP GR UR STRIP.AUTO: 1.02 (ref 1–1.03)
UROBILINOGEN UR STRIP.AUTO-MCNC: <2 MG/DL

## 2021-07-08 PROCEDURE — 99214 OFFICE O/P EST MOD 30 MIN: CPT | Performed by: INTERNAL MEDICINE

## 2021-07-08 PROCEDURE — 3074F SYST BP LT 130 MM HG: CPT | Performed by: INTERNAL MEDICINE

## 2021-07-08 PROCEDURE — 3008F BODY MASS INDEX DOCD: CPT | Performed by: INTERNAL MEDICINE

## 2021-07-08 PROCEDURE — 81003 URINALYSIS AUTO W/O SCOPE: CPT | Performed by: INTERNAL MEDICINE

## 2021-07-08 PROCEDURE — 3078F DIAST BP <80 MM HG: CPT | Performed by: INTERNAL MEDICINE

## 2021-07-08 RX ORDER — LANCETS
1 EACH MISCELLANEOUS DAILY
COMMUNITY
End: 2021-12-02 | Stop reason: ALTCHOICE

## 2021-07-08 RX ORDER — LANCETS
1 EACH MISCELLANEOUS DAILY
Qty: 100 EACH | Refills: 0 | Status: SHIPPED | OUTPATIENT
Start: 2021-07-08 | End: 2021-12-02 | Stop reason: ALTCHOICE

## 2021-07-08 NOTE — PROGRESS NOTES
HPI:   Karri Cochran is a 61year old female who presents for recheck of her diabetes.  Pt complains of new onset diabetes last year which she has controlled now w/diet and had been doing some exercise but has really slowed down again  She checks her suga (AIRBORNE OR) Take by mouth as needed.                Patient Active Problem List:     Obese     Metabolic syndrome     Hyperlipidemia     Special screening for malignant neoplasms, colon     Colon polyp     Recurrent herpes labialis     Type 2 diabetes johnson 21 6 - 29 U/L   LIPID PANEL   Result Value Ref Range    CHOLESTEROL, TOTAL 148 <200 mg/dL    HDL CHOLESTEROL 57 > OR = 50 mg/dL    TRIGLYCERIDES 96 <150 mg/dL    LDL-CHOLESTEROL 73 mg/dL (calc)    CHOL/HDLC RATIO 2.6 <5.0 (calc)    NON-HDL CHOLESTEROL 91 < Hemoglobin A1C in 3 months      Glucose, Serum          Meds & Refills for this Visit:  Requested Prescriptions     Signed Prescriptions Disp Refills   • Accu-Chek Softclix Lancets Does not apply Misc 100 each 0     Si lancet by Finger stick route candida

## 2021-09-14 ENCOUNTER — HOSPITAL ENCOUNTER (OUTPATIENT)
Dept: MAMMOGRAPHY | Age: 61
Discharge: HOME | End: 2021-09-14
Attending: INTERNAL MEDICINE
Payer: COMMERCIAL

## 2021-09-14 DIAGNOSIS — Z12.31 BREAST CANCER SCREENING BY MAMMOGRAM: ICD-10-CM

## 2021-09-14 PROCEDURE — 77063 BREAST TOMOSYNTHESIS BI: CPT | Performed by: INTERNAL MEDICINE

## 2021-09-14 PROCEDURE — 77067 SCR MAMMO BI INCL CAD: CPT | Performed by: INTERNAL MEDICINE

## 2021-09-27 DIAGNOSIS — E78.00 PURE HYPERCHOLESTEROLEMIA: ICD-10-CM

## 2021-09-27 RX ORDER — ATORVASTATIN CALCIUM 20 MG/1
TABLET, FILM COATED ORAL
Qty: 30 TABLET | Refills: 0 | Status: SHIPPED | OUTPATIENT
Start: 2021-09-27 | End: 2021-11-03

## 2021-10-12 PROCEDURE — 3044F HG A1C LEVEL LT 7.0%: CPT | Performed by: INTERNAL MEDICINE

## 2021-11-01 ENCOUNTER — OFFICE VISIT (OUTPATIENT)
Dept: INTERNAL MEDICINE CLINIC | Facility: CLINIC | Age: 61
End: 2021-11-01
Payer: COMMERCIAL

## 2021-11-01 VITALS
RESPIRATION RATE: 16 BRPM | DIASTOLIC BLOOD PRESSURE: 78 MMHG | HEIGHT: 62 IN | HEART RATE: 74 BPM | SYSTOLIC BLOOD PRESSURE: 136 MMHG | WEIGHT: 225.63 LBS | OXYGEN SATURATION: 97 % | TEMPERATURE: 98 F | BODY MASS INDEX: 41.52 KG/M2

## 2021-11-01 DIAGNOSIS — G47.33 OSA (OBSTRUCTIVE SLEEP APNEA): ICD-10-CM

## 2021-11-01 DIAGNOSIS — E78.00 PURE HYPERCHOLESTEROLEMIA: ICD-10-CM

## 2021-11-01 DIAGNOSIS — Z23 NEED FOR VACCINATION: ICD-10-CM

## 2021-11-01 DIAGNOSIS — E66.01 CLASS 3 SEVERE OBESITY DUE TO EXCESS CALORIES WITHOUT SERIOUS COMORBIDITY WITH BODY MASS INDEX (BMI) OF 40.0 TO 44.9 IN ADULT (HCC): ICD-10-CM

## 2021-11-01 DIAGNOSIS — Z00.00 ROUTINE GENERAL MEDICAL EXAMINATION AT A HEALTH CARE FACILITY: Primary | ICD-10-CM

## 2021-11-01 DIAGNOSIS — E88.81 METABOLIC SYNDROME: ICD-10-CM

## 2021-11-01 DIAGNOSIS — E11.9 TYPE 2 DIABETES MELLITUS WITHOUT COMPLICATION, WITHOUT LONG-TERM CURRENT USE OF INSULIN (HCC): ICD-10-CM

## 2021-11-01 PROCEDURE — 99213 OFFICE O/P EST LOW 20 MIN: CPT | Performed by: INTERNAL MEDICINE

## 2021-11-01 PROCEDURE — 3075F SYST BP GE 130 - 139MM HG: CPT | Performed by: INTERNAL MEDICINE

## 2021-11-01 PROCEDURE — 3078F DIAST BP <80 MM HG: CPT | Performed by: INTERNAL MEDICINE

## 2021-11-01 PROCEDURE — 90686 IIV4 VACC NO PRSV 0.5 ML IM: CPT | Performed by: INTERNAL MEDICINE

## 2021-11-01 PROCEDURE — 99396 PREV VISIT EST AGE 40-64: CPT | Performed by: INTERNAL MEDICINE

## 2021-11-01 PROCEDURE — 3008F BODY MASS INDEX DOCD: CPT | Performed by: INTERNAL MEDICINE

## 2021-11-01 PROCEDURE — 90471 IMMUNIZATION ADMIN: CPT | Performed by: INTERNAL MEDICINE

## 2021-11-01 NOTE — PROGRESS NOTES
HPI:   Leeanna Calderon is a 61year old female who presents for a complete physical exam.  She is here for diabetes, diet controlled  She is interested in dm education, hasn't gone yet  Interested in wt loss clinic  Did not do SS, had LILLI on MAC, would lik 1 tablet by mouth daily. • Multiple Vitamins-Minerals (AIRBORNE OR) Take by mouth as needed.               Family History   Problem Relation Age of Onset   • Heart Attack Father         52's   • Glaucoma Maternal Grandmother    • Other (Other) Maternal HA, dizziness   PSYCH- no anxiety, depressed mood or loss of interest      EXAM:   /78 (BP Location: Left arm, Patient Position: Sitting, Cuff Size: adult)   Pulse 74   Temp 97.8 °F (36.6 °C)   Resp 16   Ht 5' 2\" (1.575 m)   Wt 225 lb 9.6 oz (102.3 education    (E11.9) Type 2 diabetes mellitus without complication, without long-term current use of insulin (HCC)  Plan: COMP METABOLIC PANEL (14), LIPID PANEL,         HEMOGLOBIN A1C, MICROALB/CREAT RATIO, RANDOM         URINE        Controlled on diet,

## 2021-11-03 RX ORDER — ATORVASTATIN CALCIUM 20 MG/1
20 TABLET, FILM COATED ORAL DAILY
Qty: 90 TABLET | Refills: 1 | Status: SHIPPED | OUTPATIENT
Start: 2021-11-03

## 2021-11-03 RX ORDER — ATORVASTATIN CALCIUM 20 MG/1
TABLET, FILM COATED ORAL
Qty: 30 TABLET | Refills: 0 | Status: SHIPPED | OUTPATIENT
Start: 2021-11-03 | End: 2021-11-03

## 2021-11-04 ENCOUNTER — MED REC SCAN ONLY (OUTPATIENT)
Dept: INTERNAL MEDICINE CLINIC | Facility: CLINIC | Age: 61
End: 2021-11-04

## 2021-11-05 ENCOUNTER — PATIENT MESSAGE (OUTPATIENT)
Dept: INTERNAL MEDICINE CLINIC | Facility: CLINIC | Age: 61
End: 2021-11-05

## 2021-11-05 DIAGNOSIS — E66.01 CLASS 3 SEVERE OBESITY DUE TO EXCESS CALORIES WITHOUT SERIOUS COMORBIDITY WITH BODY MASS INDEX (BMI) OF 40.0 TO 44.9 IN ADULT (HCC): ICD-10-CM

## 2021-11-05 DIAGNOSIS — G47.33 OSA (OBSTRUCTIVE SLEEP APNEA): Primary | ICD-10-CM

## 2021-11-09 ENCOUNTER — NURSE ONLY (OUTPATIENT)
Dept: ENDOCRINOLOGY CLINIC | Facility: CLINIC | Age: 61
End: 2021-11-09
Payer: COMMERCIAL

## 2021-11-09 DIAGNOSIS — E11.9 TYPE 2 DIABETES MELLITUS WITHOUT COMPLICATION, WITHOUT LONG-TERM CURRENT USE OF INSULIN (HCC): ICD-10-CM

## 2021-11-09 PROCEDURE — G0108 DIAB MANAGE TRN  PER INDIV: HCPCS | Performed by: DIETITIAN, REGISTERED

## 2021-11-10 VITALS — WEIGHT: 225.19 LBS | BODY MASS INDEX: 41 KG/M2

## 2021-11-11 NOTE — TELEPHONE ENCOUNTER
From: Torsten Dudley  To: Marcy Angelo MD  Sent: 11/5/2021 3:21 PM CDT  Subject: Sleep Study       I have contacted my insurance provider to find out my coverage.  I have been told I don’t meet there conditions for an attended sleep study and the

## 2021-11-11 NOTE — PROGRESS NOTES
Mitchell Galeazzi  : 1960 attended Step 1 Diabetic Education:    Date: 2021  Referring Provider: Dr. Laura Cannon  Start time: 1:30pm End time: 2:30pm    Wt 225 lb 3.2 oz   BMI 41.19 kg/m²     HEMOGLOBIN A1c (% of total Hgb)   Date Value   10/

## 2021-11-11 NOTE — TELEPHONE ENCOUNTER
See pt's MyChart message below. Pending home sleep study if okay. Per 11/1/21 OV:  Risk for LILLI based on anesthesia MAC exam. Her brother has LILLI. Pt has no witnessed apneas or snoring. She does not awaken w/HAs.  She has occ dozing off daytime  But not

## 2021-11-12 NOTE — TELEPHONE ENCOUNTER
Is there a way we can use her history of \"suspicious for LILLI noted while MAC anesthesia? Somehow and get it covered?

## 2021-11-12 NOTE — TELEPHONE ENCOUNTER
Or can we find somewhere the anesthesia questionairre that deemed her at risk?  It was a while ago, not this year

## 2021-11-19 ENCOUNTER — PATIENT MESSAGE (OUTPATIENT)
Dept: INTERNAL MEDICINE CLINIC | Facility: CLINIC | Age: 61
End: 2021-11-19

## 2021-11-19 DIAGNOSIS — Z86.19 HISTORY OF COLD SORES: Primary | ICD-10-CM

## 2021-11-19 NOTE — TELEPHONE ENCOUNTER
Spoke to Clair at Sleep study  Stated she will be happy to get referral approved for pt  Stated she will contact pt on 11/22/21 to further the process  [address] is always a problem for approval  Stated if it does not approve then they can set pt up fo

## 2021-11-23 RX ORDER — VALACYCLOVIR HYDROCHLORIDE 1 G/1
1000 TABLET, FILM COATED ORAL EVERY 12 HOURS SCHEDULED
Qty: 2 TABLET | Refills: 0 | Status: SHIPPED | OUTPATIENT
Start: 2021-11-23 | End: 2021-11-24

## 2021-11-23 NOTE — TELEPHONE ENCOUNTER
From: Bg Ashford  To: Edith Calle MD  Sent: 11/19/2021 3:40 PM CST  Subject: Valacyclovir    I’d like to request a prescription for Valacyclovir. I’ve used to before for cold sores and it worked well.  I’d like to have it on hand if & when I

## 2021-12-02 ENCOUNTER — TELEPHONE (OUTPATIENT)
Dept: ENDOCRINOLOGY CLINIC | Facility: CLINIC | Age: 61
End: 2021-12-02

## 2021-12-02 ENCOUNTER — NURSE ONLY (OUTPATIENT)
Dept: ENDOCRINOLOGY CLINIC | Facility: CLINIC | Age: 61
End: 2021-12-02
Payer: COMMERCIAL

## 2021-12-02 DIAGNOSIS — E11.9 TYPE 2 DIABETES MELLITUS WITHOUT COMPLICATION, WITHOUT LONG-TERM CURRENT USE OF INSULIN (HCC): ICD-10-CM

## 2021-12-02 PROCEDURE — G0109 DIAB MANAGE TRN IND/GROUP: HCPCS | Performed by: DIETITIAN, REGISTERED

## 2021-12-02 RX ORDER — LANCING DEVICE/LANCETS
KIT MISCELLANEOUS
Qty: 1 KIT | Refills: 1 | Status: SHIPPED | OUTPATIENT
Start: 2021-12-02

## 2021-12-02 RX ORDER — LANCETS
EACH MISCELLANEOUS
Qty: 204 EACH | Refills: 3 | Status: SHIPPED | OUTPATIENT
Start: 2021-12-02

## 2021-12-02 RX ORDER — BLOOD SUGAR DIAGNOSTIC
STRIP MISCELLANEOUS
Qty: 200 STRIP | Refills: 3 | Status: SHIPPED | OUTPATIENT
Start: 2021-12-02 | End: 2022-11-29

## 2021-12-03 NOTE — TELEPHONE ENCOUNTER
Contacted [facility] @[phone] to check if Accu-chek products are on pt.'s formulary & they are . Sent new prescription for testing supplies to [facility]; Pedrito Hurley

## 2021-12-03 NOTE — PROGRESS NOTES
Aiyana Barrow  DOB12/28/1960 attended Step 2 Group Class: Pathophysiology of Diabetes, Types of Diabetes, Sources of Carbohydrate, Blood Glucose Targets, Medications    Date: 12/2/2021  Referring Provider: Dr. Julianna Burleson  Start time: 2pm End time:

## 2021-12-06 ENCOUNTER — OFFICE VISIT (OUTPATIENT)
Dept: SLEEP CENTER | Age: 61
End: 2021-12-06
Attending: INTERNAL MEDICINE
Payer: COMMERCIAL

## 2021-12-06 DIAGNOSIS — G47.33 OSA (OBSTRUCTIVE SLEEP APNEA): ICD-10-CM

## 2021-12-06 PROCEDURE — 95806 SLEEP STUDY UNATT&RESP EFFT: CPT

## 2021-12-07 PROBLEM — G47.33 OSA (OBSTRUCTIVE SLEEP APNEA): Status: ACTIVE | Noted: 2021-12-07

## 2021-12-07 NOTE — PROCEDURES
[address] [address]       Accredited by the [facility] ([facility])    PATIENT'S NAME:        Abimael Monge PHYSICIAN:   Seda Hinson M.D.   REFERRING PHYSICIAN:   Rama Jain, sleepy. 3.   The patient should avoid alcohol or sedatives prior to sleep. Dictated By Denia Celeste M.D.  d: 12/07/2021 13:07:18  t: 12/07/2021 16:18:49  [facility] [id number]  RN/    cc: ANUPAM Crockett M.D.

## 2021-12-09 ENCOUNTER — NURSE ONLY (OUTPATIENT)
Dept: ENDOCRINOLOGY CLINIC | Facility: CLINIC | Age: 61
End: 2021-12-09
Payer: COMMERCIAL

## 2021-12-09 DIAGNOSIS — E11.9 TYPE 2 DIABETES MELLITUS WITHOUT COMPLICATION, WITHOUT LONG-TERM CURRENT USE OF INSULIN (HCC): ICD-10-CM

## 2021-12-09 PROCEDURE — G0109 DIAB MANAGE TRN IND/GROUP: HCPCS | Performed by: DIETITIAN, REGISTERED

## 2021-12-09 NOTE — PROGRESS NOTES
Hafsa Mitchell  DOB12/28/1960 attended Step 3 Group Class: Carbohydrate Counting, Treating Lows    Date: 12/9/2021  Referring Provider: Dr. Thom Martin  Start time: 2pm End time: 4pm    The patient participated during the class: Pt was able to identify

## 2021-12-13 NOTE — PROGRESS NOTES
Spoke to pt. Made aware of results & recommendations. Pt voiced understanding.   Pt stated she was notified to hear from them within 10 days  Pt stated she has information on Dr Melvin Gonzalez if she needs to contact them

## 2021-12-16 ENCOUNTER — NURSE ONLY (OUTPATIENT)
Dept: ENDOCRINOLOGY CLINIC | Facility: CLINIC | Age: 61
End: 2021-12-16
Payer: COMMERCIAL

## 2021-12-16 DIAGNOSIS — E11.9 TYPE 2 DIABETES MELLITUS WITHOUT COMPLICATION, WITHOUT LONG-TERM CURRENT USE OF INSULIN (HCC): Primary | ICD-10-CM

## 2021-12-16 PROCEDURE — G0109 DIAB MANAGE TRN IND/GROUP: HCPCS | Performed by: DIETITIAN, REGISTERED

## 2021-12-30 NOTE — PROGRESS NOTES
Paco Solders  DOB12/28/1960 attended Step 4 Group Class: Reducing Complications, Special Occasion Eating, Treating Hyperglycemia  Date: 12/16/2021  Referring Provider: Dr. Paul Esqueda  Start time: 2pm End time: 4pm     The patient participated during

## 2022-01-04 ENCOUNTER — OFFICE VISIT (OUTPATIENT)
Dept: INTERNAL MEDICINE CLINIC | Facility: CLINIC | Age: 62
End: 2022-01-04
Payer: COMMERCIAL

## 2022-01-04 VITALS
OXYGEN SATURATION: 97 % | BODY MASS INDEX: 42.12 KG/M2 | DIASTOLIC BLOOD PRESSURE: 82 MMHG | HEIGHT: 61.5 IN | WEIGHT: 226 LBS | RESPIRATION RATE: 16 BRPM | HEART RATE: 93 BPM | SYSTOLIC BLOOD PRESSURE: 140 MMHG

## 2022-01-04 DIAGNOSIS — E78.00 PURE HYPERCHOLESTEROLEMIA: ICD-10-CM

## 2022-01-04 DIAGNOSIS — Z51.81 THERAPEUTIC DRUG MONITORING: Primary | ICD-10-CM

## 2022-01-04 DIAGNOSIS — E66.01 CLASS 3 SEVERE OBESITY DUE TO EXCESS CALORIES WITH SERIOUS COMORBIDITY AND BODY MASS INDEX (BMI) OF 40.0 TO 44.9 IN ADULT (HCC): ICD-10-CM

## 2022-01-04 DIAGNOSIS — G47.33 OSA (OBSTRUCTIVE SLEEP APNEA): ICD-10-CM

## 2022-01-04 DIAGNOSIS — E11.9 TYPE 2 DIABETES MELLITUS WITHOUT COMPLICATION, WITHOUT LONG-TERM CURRENT USE OF INSULIN (HCC): ICD-10-CM

## 2022-01-04 PROCEDURE — 3008F BODY MASS INDEX DOCD: CPT | Performed by: NURSE PRACTITIONER

## 2022-01-04 PROCEDURE — 3079F DIAST BP 80-89 MM HG: CPT | Performed by: NURSE PRACTITIONER

## 2022-01-04 PROCEDURE — 99215 OFFICE O/P EST HI 40 MIN: CPT | Performed by: NURSE PRACTITIONER

## 2022-01-04 PROCEDURE — 3077F SYST BP >= 140 MM HG: CPT | Performed by: NURSE PRACTITIONER

## 2022-01-04 RX ORDER — SEMAGLUTIDE 1.34 MG/ML
0.5 INJECTION, SOLUTION SUBCUTANEOUS WEEKLY
Qty: 1.5 ML | Refills: 1 | Status: SHIPPED | OUTPATIENT
Start: 2022-01-04

## 2022-01-04 RX ORDER — SEMAGLUTIDE 1.34 MG/ML
0.25 INJECTION, SOLUTION SUBCUTANEOUS WEEKLY
Qty: 0.25 ML | Refills: 0 | Status: CANCELLED | OUTPATIENT
Start: 2022-01-04

## 2022-01-04 NOTE — PROGRESS NOTES
HISTORY OF PRESENT ILLNESS  Patient presents with:  Weight Problem: referred by Eleazar Hood, never tried any weight loss meds, open to trying     Amadeo Miguel is a 64year old female new to our office today for initiation of medical weight loss program.  MARLENE vitamin d and b-complex  Exercise: 1-2 times per week walking  Stress level: 4-5/10  Sleep hours and integrity: 6-8 Hours per night    MEDICAL HISTORY  PMH reviewed:   Cardiac disorders:negative   Depression/anxiety: negative  Glaucoma: negative  Kidney st completed in office  GI: +BS, soft, no masses, HSM or tenderness  EXTREMITIES: grossly intact  NEURO: Oriented times three, full ROM of bilateral UE/LE  PSYCH: pleasant, cooperative, normal mood and affect    Lab Results   Component Value Date     ( OR), Take by mouth as needed. , Disp: , Rfl:     No current facility-administered medications on file prior to visit.       ASSESSMENT/PLAN  (Z51.81) Therapeutic drug monitoring  (primary encounter diagnosis)  Plan: OP REFERRAL TO DIETITIAN EMG WLC (WLC USE on comprehensive weight loss plan including attention to nutrition, exercise and behavior/stress management for success. See patient instruction below for more details.     Total time spent on chart review, pre-charting, obtaining history, counseling, and e it)) to help you to monitor daily dietary intake and you will be able to see if you are eating the right amount of calories, protein, carbs                With My Fitness Pal-->When you set-up the rica or need to adjust settings:                Goals should

## 2022-01-04 NOTE — PATIENT INSTRUCTIONS
We are here to support you with weight loss, but please remember that you still need your primary care provider for your routine health maintenance.       PLAN:  Will start ozempic 0.25mg weekly X 4 weeks and then increase to 0.5mg weekly X 4 weeks   Follow number per day)                   ** Daily INPUT> Look at nutrition section-- \"nutrients\" and it will break down your macros for the day (ie. Protein, carbs, fibers, sugars and fats). Try to stay within these numbers daily     2.  \"7 minute workout\" to

## 2022-01-06 ENCOUNTER — NURSE ONLY (OUTPATIENT)
Dept: ENDOCRINOLOGY CLINIC | Facility: CLINIC | Age: 62
End: 2022-01-06
Payer: COMMERCIAL

## 2022-01-06 DIAGNOSIS — E11.9 TYPE 2 DIABETES MELLITUS WITHOUT COMPLICATION, WITHOUT LONG-TERM CURRENT USE OF INSULIN (HCC): ICD-10-CM

## 2022-01-06 PROCEDURE — G0109 DIAB MANAGE TRN IND/GROUP: HCPCS | Performed by: DIETITIAN, REGISTERED

## 2022-01-06 NOTE — PROGRESS NOTES
Georgi Malik  DOB12/28/1960 attended Step 5 Group Class: Heart Healthy Diet, Exercise, Stress Management  Date: 1/6/2022  Referring Provider: Dr. Nicolle Her  Start time: 2pm End time: 3pm    The patient participated during the class: .step    Heal

## 2022-02-03 ENCOUNTER — NURSE ONLY (OUTPATIENT)
Dept: ENDOCRINOLOGY CLINIC | Facility: CLINIC | Age: 62
End: 2022-02-03
Payer: COMMERCIAL

## 2022-02-03 DIAGNOSIS — E11.9 TYPE 2 DIABETES MELLITUS WITHOUT COMPLICATION, WITHOUT LONG-TERM CURRENT USE OF INSULIN (HCC): ICD-10-CM

## 2022-02-03 PROCEDURE — G0109 DIAB MANAGE TRN IND/GROUP: HCPCS | Performed by: DIETITIAN, REGISTERED

## 2022-02-08 ENCOUNTER — OFFICE VISIT (OUTPATIENT)
Dept: INTERNAL MEDICINE CLINIC | Facility: CLINIC | Age: 62
End: 2022-02-08
Payer: COMMERCIAL

## 2022-02-08 VITALS
SYSTOLIC BLOOD PRESSURE: 134 MMHG | OXYGEN SATURATION: 97 % | HEIGHT: 61.5 IN | BODY MASS INDEX: 41.01 KG/M2 | RESPIRATION RATE: 17 BRPM | HEART RATE: 84 BPM | WEIGHT: 220 LBS | DIASTOLIC BLOOD PRESSURE: 88 MMHG

## 2022-02-08 DIAGNOSIS — E11.9 TYPE 2 DIABETES MELLITUS WITHOUT COMPLICATION, WITHOUT LONG-TERM CURRENT USE OF INSULIN (HCC): ICD-10-CM

## 2022-02-08 DIAGNOSIS — E78.00 PURE HYPERCHOLESTEROLEMIA: ICD-10-CM

## 2022-02-08 DIAGNOSIS — E66.01 CLASS 3 SEVERE OBESITY DUE TO EXCESS CALORIES WITH SERIOUS COMORBIDITY AND BODY MASS INDEX (BMI) OF 40.0 TO 44.9 IN ADULT (HCC): ICD-10-CM

## 2022-02-08 DIAGNOSIS — G47.33 OSA (OBSTRUCTIVE SLEEP APNEA): ICD-10-CM

## 2022-02-08 DIAGNOSIS — Z51.81 THERAPEUTIC DRUG MONITORING: Primary | ICD-10-CM

## 2022-02-08 PROCEDURE — 3075F SYST BP GE 130 - 139MM HG: CPT | Performed by: NURSE PRACTITIONER

## 2022-02-08 PROCEDURE — 3008F BODY MASS INDEX DOCD: CPT | Performed by: NURSE PRACTITIONER

## 2022-02-08 PROCEDURE — 99214 OFFICE O/P EST MOD 30 MIN: CPT | Performed by: NURSE PRACTITIONER

## 2022-02-08 PROCEDURE — 3079F DIAST BP 80-89 MM HG: CPT | Performed by: NURSE PRACTITIONER

## 2022-02-08 RX ORDER — SEMAGLUTIDE 1.34 MG/ML
1 INJECTION, SOLUTION SUBCUTANEOUS WEEKLY
Qty: 9 ML | Refills: 0 | Status: SHIPPED | OUTPATIENT
Start: 2022-02-23

## 2022-02-08 RX ORDER — SEMAGLUTIDE 1.34 MG/ML
0.5 INJECTION, SOLUTION SUBCUTANEOUS WEEKLY
Qty: 1.5 ML | Refills: 1 | Status: CANCELLED | OUTPATIENT
Start: 2022-02-08

## 2022-02-08 NOTE — PATIENT INSTRUCTIONS
We are here to support you with weight loss, but please remember that you still need your primary care provider for your routine health maintenance. PLAN:  Will continue ozempic 0.5mg X 3 and increase to 1mg weekly   Try tracking on rica (ie. "[employer]"ary, Jildy pal, loseit  Follow up with me in 5 weeks  Schedule follow up appointments: Antonella Lawrence (dietitian) or Cheryle Memo (presurgery dietitian)   Check for insurance coverage for dietitian and labwork prior to scheduling appointment. Please try to work on the following dietary changes:  1. Goals: Aim for 20-30 grams of protein/ meal  i. Aim for 120 grams of carbohydrates/day  ii. Eat 4-6 vegetables/day  iii. Avoid skipping meals- eat every 4-5 hours  iv. Aim for 3 meals/day  2. Drink lots of water and cut down on soda/juice consumption if soda/juice drinker  3. Focus on protein: (15-30 grams with each meal) ie. greek yogurt, cottage cheese, string cheese, hard boiled eggs  4. Healthy snacks: peanut butter and apples, hummus and carrots, berries, nuts (1/4 cup), tuna and crackers                 Protein Shakes: Premier protein or Core Power                Protein Bars: Rx Bars, Oatmega, Power Crunch                 Sargento balanced breaks (cheese and nuts)- without chocolate  5. Reduce carbohydrates which includes sweets as well as rice, pasta, potatoes, bread, corn and instead choose whole grain options or more protein or vegetables (4-6 servings of vegetables per day)  6. Get a good night of sleep  7. Try to decrease stress in life     Please download apps:  1. \"My Fitness Pal\" (other option is Lose it)) to help you to monitor daily dietary intake and you will be able to see if you are eating the right amount of calories, protein, carbs                With My Fitness Pal-->When you set-up the rica or need to adjust settings:                Goals should include:                  Lose 1.5-2 lbs per week                Activity level: not very active (can't count exercise towards calorie number per day)                   ** Daily INPUT> Look at nutrition section-- \"nutrients\" and it will break down your macros for the day (ie. Protein, carbs, fibers, sugars and fats). Try to stay within these numbers daily     2. \"7 minute workout\" to help with exercise/activity which takes 7 minutes of your day and that you can do at home! 3. \"Calm\" or \"Headspace\" which helps with mindfulness, meditation, clarity, sleep, and job to your daily life. 4. [employer] blog for healthy recipe ideas  5. [employer] for low carb resources    HIGH PROTEIN SNACK IDEAS  -cottage cheese  -plain yogurt  -kefir  -hard-boiled eggs  -natural cheeses  -nuts (measure portion size)   -unsweetened nut butters  -dried edamame   -el seeds soaked in water or almond milk  -soy nuts  -cured meats (monitor for sodium issues)   -hummus with vegetables  -bean dip with vegetables     FRUIT  Low carb fruit options   Raspberries: Half a cup (60 grams) contains 3 grams of carbs. Blackberries: Half a cup (70 grams) contains 4 grams of carbs. Strawberries: Half a cup (100 grams) contains 6 grams of carbs. Blueberries: Half a cup (50 grams) contains 6 grams of carbs. Plum: One medium-sized (80 grams) contains 6 grams of carbs.      VEGETABLES  Low carb vegetables

## 2022-02-14 PROCEDURE — 3044F HG A1C LEVEL LT 7.0%: CPT | Performed by: NURSE PRACTITIONER

## 2022-02-14 PROCEDURE — 3061F NEG MICROALBUMINURIA REV: CPT | Performed by: NURSE PRACTITIONER

## 2022-02-15 LAB
ALBUMIN/GLOBULIN RATIO: 1.6 (CALC) (ref 1–2.5)
ALBUMIN: 4.2 G/DL (ref 3.6–5.1)
ALKALINE PHOSPHATASE: 94 U/L (ref 37–153)
ALT: 19 U/L (ref 6–29)
AST: 14 U/L (ref 10–35)
BILIRUBIN, TOTAL: 1 MG/DL (ref 0.2–1.2)
BUN: 16 MG/DL (ref 7–25)
CALCIUM: 9.5 MG/DL (ref 8.6–10.4)
CARBON DIOXIDE: 30 MMOL/L (ref 20–32)
CHLORIDE: 103 MMOL/L (ref 98–110)
CHOL/HDLC RATIO: 2.5 (CALC)
CHOLESTEROL, TOTAL: 128 MG/DL
CREATININE, RANDOM URINE: 132 MG/DL (ref 20–275)
CREATININE: 0.66 MG/DL (ref 0.5–0.99)
EGFR IF AFRICN AM: 110 ML/MIN/1.73M2
EGFR IF NONAFRICN AM: 95 ML/MIN/1.73M2
GLOBULIN: 2.7 G/DL (CALC) (ref 1.9–3.7)
GLUCOSE: 97 MG/DL (ref 65–99)
HDL CHOLESTEROL: 51 MG/DL
HEMOGLOBIN A1C: 5.7 % OF TOTAL HGB
LDL-CHOLESTEROL: 59 MG/DL (CALC)
MICROALBUMIN/CREATININE RATIO, RANDOM URINE: 6 MCG/MG CREAT
MICROALBUMIN: 0.8 MG/DL
NON-HDL CHOLESTEROL: 77 MG/DL (CALC)
POTASSIUM: 4.1 MMOL/L (ref 3.5–5.3)
PROTEIN, TOTAL: 6.9 G/DL (ref 6.1–8.1)
SODIUM: 139 MMOL/L (ref 135–146)
TRIGLYCERIDES: 94 MG/DL

## 2022-03-22 ENCOUNTER — OFFICE VISIT (OUTPATIENT)
Dept: INTERNAL MEDICINE CLINIC | Facility: CLINIC | Age: 62
End: 2022-03-22
Payer: COMMERCIAL

## 2022-03-22 VITALS
DIASTOLIC BLOOD PRESSURE: 88 MMHG | BODY MASS INDEX: 40.26 KG/M2 | WEIGHT: 216 LBS | HEIGHT: 61.5 IN | RESPIRATION RATE: 16 BRPM | HEART RATE: 83 BPM | SYSTOLIC BLOOD PRESSURE: 134 MMHG | OXYGEN SATURATION: 96 %

## 2022-03-22 DIAGNOSIS — Z51.81 THERAPEUTIC DRUG MONITORING: Primary | ICD-10-CM

## 2022-03-22 DIAGNOSIS — E11.9 TYPE 2 DIABETES MELLITUS WITHOUT COMPLICATION, WITHOUT LONG-TERM CURRENT USE OF INSULIN (HCC): ICD-10-CM

## 2022-03-22 DIAGNOSIS — E66.01 CLASS 3 SEVERE OBESITY DUE TO EXCESS CALORIES WITH SERIOUS COMORBIDITY AND BODY MASS INDEX (BMI) OF 40.0 TO 44.9 IN ADULT (HCC): ICD-10-CM

## 2022-03-22 DIAGNOSIS — G47.33 OSA (OBSTRUCTIVE SLEEP APNEA): ICD-10-CM

## 2022-03-22 DIAGNOSIS — E78.00 PURE HYPERCHOLESTEROLEMIA: ICD-10-CM

## 2022-03-22 PROCEDURE — 3008F BODY MASS INDEX DOCD: CPT | Performed by: NURSE PRACTITIONER

## 2022-03-22 PROCEDURE — 3075F SYST BP GE 130 - 139MM HG: CPT | Performed by: NURSE PRACTITIONER

## 2022-03-22 PROCEDURE — 99214 OFFICE O/P EST MOD 30 MIN: CPT | Performed by: NURSE PRACTITIONER

## 2022-03-22 PROCEDURE — 3079F DIAST BP 80-89 MM HG: CPT | Performed by: NURSE PRACTITIONER

## 2022-03-22 RX ORDER — SEMAGLUTIDE 1.34 MG/ML
1 INJECTION, SOLUTION SUBCUTANEOUS WEEKLY
Qty: 9 ML | Refills: 0 | Status: CANCELLED | OUTPATIENT
Start: 2022-03-22

## 2022-04-12 ENCOUNTER — TELEPHONE (OUTPATIENT)
Dept: INTERNAL MEDICINE CLINIC | Facility: CLINIC | Age: 62
End: 2022-04-12

## 2022-04-12 NOTE — TELEPHONE ENCOUNTER
Incoming (mail or fax):  fax  Received from:  [facility]  Documentation given to:  Lukas Pierce    Diabetic eye exam

## 2022-04-13 NOTE — TELEPHONE ENCOUNTER
Rec'd DM Eye Exam from [facility]. Updated Epic. Put in Kita[employer] in folder for review and sign off.

## 2022-05-04 ENCOUNTER — OFFICE VISIT (OUTPATIENT)
Dept: INTERNAL MEDICINE CLINIC | Facility: CLINIC | Age: 62
End: 2022-05-04
Payer: COMMERCIAL

## 2022-05-04 VITALS
BODY MASS INDEX: 39.7 KG/M2 | WEIGHT: 213 LBS | RESPIRATION RATE: 16 BRPM | HEART RATE: 92 BPM | HEIGHT: 61.5 IN | DIASTOLIC BLOOD PRESSURE: 88 MMHG | SYSTOLIC BLOOD PRESSURE: 130 MMHG | OXYGEN SATURATION: 96 %

## 2022-05-04 DIAGNOSIS — E78.00 PURE HYPERCHOLESTEROLEMIA: ICD-10-CM

## 2022-05-04 DIAGNOSIS — G47.33 OSA (OBSTRUCTIVE SLEEP APNEA): ICD-10-CM

## 2022-05-04 DIAGNOSIS — E66.01 CLASS 3 SEVERE OBESITY DUE TO EXCESS CALORIES WITH SERIOUS COMORBIDITY AND BODY MASS INDEX (BMI) OF 40.0 TO 44.9 IN ADULT (HCC): ICD-10-CM

## 2022-05-04 DIAGNOSIS — Z51.81 THERAPEUTIC DRUG MONITORING: Primary | ICD-10-CM

## 2022-05-04 DIAGNOSIS — E11.9 TYPE 2 DIABETES MELLITUS WITHOUT COMPLICATION, WITHOUT LONG-TERM CURRENT USE OF INSULIN (HCC): ICD-10-CM

## 2022-05-04 PROCEDURE — 3079F DIAST BP 80-89 MM HG: CPT | Performed by: NURSE PRACTITIONER

## 2022-05-04 PROCEDURE — 99214 OFFICE O/P EST MOD 30 MIN: CPT | Performed by: NURSE PRACTITIONER

## 2022-05-04 PROCEDURE — 3008F BODY MASS INDEX DOCD: CPT | Performed by: NURSE PRACTITIONER

## 2022-05-04 PROCEDURE — 3075F SYST BP GE 130 - 139MM HG: CPT | Performed by: NURSE PRACTITIONER

## 2022-05-04 RX ORDER — SEMAGLUTIDE 1.34 MG/ML
1 INJECTION, SOLUTION SUBCUTANEOUS WEEKLY
Qty: 9 ML | Refills: 0 | Status: SHIPPED | OUTPATIENT
Start: 2022-05-04

## 2022-05-04 NOTE — PATIENT INSTRUCTIONS
We are here to support you with weight loss, but please remember that you still need your primary care provider for your routine health maintenance. PLAN:  Will continue with ozempic 1mg weekly (can think about increasing this to 2 mg)  Also, if you want to do stimulant medication (ie. Phentermine- we would need an ekg)  Follow up with me in 8 weeks  Schedule follow up appointments: Andrea Morfin (dietitian) or Duncan Mendoza (presurgery dietitian)   Check for insurance coverage for dietitian and labwork prior to scheduling appointment. Please try to work on the following dietary changes:  1. Goals: Aim for 20-30 grams of protein/ meal  i. Aim for 110 grams of carbohydrates/day  ii. Eat 4-6 vegetables/day  iii. Avoid skipping meals- eat every 4-5 hours  iv. Aim for 3 meals/day  2. Drink lots of water and cut down on soda/juice consumption if soda/juice drinker  3. Focus on protein: (15-30 grams with each meal) ie. greek yogurt, cottage cheese, string cheese, hard boiled eggs  4. Healthy snacks: peanut butter and apples, hummus and carrots, berries, nuts (1/4 cup), tuna and crackers                 Protein Shakes: Premier protein or Core Power                Protein Bars: Rx Bars, Oatmega, Power Crunch                 Sargento balanced breaks (cheese and nuts)- without chocolate  5. Reduce carbohydrates which includes sweets as well as rice, pasta, potatoes, bread, corn and instead choose whole grain options or more protein or vegetables (4-6 servings of vegetables per day)  6. Get a good night of sleep  7. Try to decrease stress in life     Please download apps:  1. \"My Fitness Pal\" (other option is Lose it)) to help you to monitor daily dietary intake and you will be able to see if you are eating the right amount of calories, protein, carbs                With My Fitness Pal-->When you set-up the rica or need to adjust settings:                Goals should include:                  Lose 1.5-2 lbs per week Activity level: not very active (can't count exercise towards calorie number per day)                   ** Daily INPUT> Look at nutrition section-- \"nutrients\" and it will break down your macros for the day (ie. Protein, carbs, fibers, sugars and fats). Try to stay within these numbers daily     2. \"7 minute workout\" to help with exercise/activity which takes 7 minutes of your day and that you can do at home! 3. \"Calm\" or \"Headspace\" which helps with mindfulness, meditation, clarity, sleep, and job to your daily life. 4. [employer] blog for healthy recipe ideas  5. [employer] for low carb resources    HIGH PROTEIN SNACK IDEAS  -cottage cheese  -plain yogurt  -kefir  -hard-boiled eggs  -natural cheeses  -nuts (measure portion size)   -unsweetened nut butters  -dried edamame   -el seeds soaked in water or almond milk  -soy nuts  -cured meats (monitor for sodium issues)   -hummus with vegetables  -bean dip with vegetables     FRUIT  Low carb fruit options   Raspberries: Half a cup (60 grams) contains 3 grams of carbs. Blackberries: Half a cup (70 grams) contains 4 grams of carbs. Strawberries: Half a cup (100 grams) contains 6 grams of carbs. Blueberries: Half a cup (50 grams) contains 6 grams of carbs. Plum: One medium-sized (80 grams) contains 6 grams of carbs.      VEGETABLES  Low carb vegetables

## 2022-05-31 ENCOUNTER — PATIENT MESSAGE (OUTPATIENT)
Dept: INTERNAL MEDICINE CLINIC | Facility: CLINIC | Age: 62
End: 2022-05-31

## 2022-05-31 RX ORDER — SEMAGLUTIDE 2.68 MG/ML
2 INJECTION, SOLUTION SUBCUTANEOUS WEEKLY
Qty: 3 ML | Refills: 1 | Status: SHIPPED | OUTPATIENT
Start: 2022-05-31

## 2022-05-31 NOTE — TELEPHONE ENCOUNTER
Last seen 5/4/22 to f/u in 8 weeks  PLAN:  Will continue with ozempic 1mg weekly (can think about increasing this to 2 mg)  Also, if you want to do stimulant medication (ie.  Phentermine- we would need an ekg)  Follow up with me in 8 weeks

## 2022-06-16 DIAGNOSIS — E78.00 PURE HYPERCHOLESTEROLEMIA: ICD-10-CM

## 2022-06-17 RX ORDER — ATORVASTATIN CALCIUM 20 MG/1
TABLET, FILM COATED ORAL
Qty: 90 TABLET | Refills: 0 | Status: SHIPPED | OUTPATIENT
Start: 2022-06-17

## 2022-06-17 NOTE — TELEPHONE ENCOUNTER
Cholesterol Medication Protocol Passed 06/16/2022 04:27 PM   Protocol Details  ALT < 80    ALT resulted within past year    Lipid panel within past 12 months    Appointment within past 12 or next 3 months     Future Appointments   Date Time Provider David Yo   6/29/2022 12:40 PM Bree Kim, EARLE Bear EMG [facility]

## 2022-06-29 ENCOUNTER — OFFICE VISIT (OUTPATIENT)
Dept: INTERNAL MEDICINE CLINIC | Facility: CLINIC | Age: 62
End: 2022-06-29
Payer: COMMERCIAL

## 2022-06-29 VITALS
HEART RATE: 74 BPM | RESPIRATION RATE: 16 BRPM | HEIGHT: 61.5 IN | DIASTOLIC BLOOD PRESSURE: 84 MMHG | WEIGHT: 207 LBS | OXYGEN SATURATION: 97 % | BODY MASS INDEX: 38.58 KG/M2 | SYSTOLIC BLOOD PRESSURE: 128 MMHG

## 2022-06-29 DIAGNOSIS — Z51.81 THERAPEUTIC DRUG MONITORING: Primary | ICD-10-CM

## 2022-06-29 DIAGNOSIS — G47.33 OSA (OBSTRUCTIVE SLEEP APNEA): ICD-10-CM

## 2022-06-29 DIAGNOSIS — E66.01 CLASS 3 SEVERE OBESITY DUE TO EXCESS CALORIES WITH SERIOUS COMORBIDITY AND BODY MASS INDEX (BMI) OF 40.0 TO 44.9 IN ADULT (HCC): ICD-10-CM

## 2022-06-29 DIAGNOSIS — E78.00 PURE HYPERCHOLESTEROLEMIA: ICD-10-CM

## 2022-06-29 DIAGNOSIS — E11.9 TYPE 2 DIABETES MELLITUS WITHOUT COMPLICATION, WITHOUT LONG-TERM CURRENT USE OF INSULIN (HCC): ICD-10-CM

## 2022-06-29 PROCEDURE — 3074F SYST BP LT 130 MM HG: CPT | Performed by: NURSE PRACTITIONER

## 2022-06-29 PROCEDURE — 3008F BODY MASS INDEX DOCD: CPT | Performed by: NURSE PRACTITIONER

## 2022-06-29 PROCEDURE — 99214 OFFICE O/P EST MOD 30 MIN: CPT | Performed by: NURSE PRACTITIONER

## 2022-06-29 PROCEDURE — 3079F DIAST BP 80-89 MM HG: CPT | Performed by: NURSE PRACTITIONER

## 2022-06-29 NOTE — PATIENT INSTRUCTIONS
We are here to support you with weight loss, but please remember that you still need your primary care provider for your routine health maintenance. PLAN:  Will continue with ozempic 2mg   Can get outpatient ekg done if you would like  Follow up with me in 8 weeks  Schedule follow up appointments: Tisha Pearce (dietitian) or Matteo Garcia (presurgery dietitian)   Check for insurance coverage for dietitian and labwork prior to scheduling appointment. Please try to work on the following dietary changes:  1. Goals: Aim for 20-30 grams of protein/ meal  i. Aim for 100 grams of carbohydrates/day  ii. Eat 4-6 vegetables/day  iii. Avoid skipping meals- eat every 4-5 hours  iv. Aim for 3 meals/day  2. Drink lots of water and cut down on soda/juice consumption if soda/juice drinker  3. Focus on protein: (15-30 grams with each meal) ie. greek yogurt, cottage cheese, string cheese, hard boiled eggs  4. Healthy snacks: peanut butter and apples, hummus and carrots, berries, nuts (1/4 cup), tuna and crackers                 Protein Shakes: Premier protein or Core Power                Protein Bars: Rx Bars, Oatmega, Power Crunch                 Sargento balanced breaks (cheese and nuts)- without chocolate  5. Reduce carbohydrates which includes sweets as well as rice, pasta, potatoes, bread, corn and instead choose whole grain options or more protein or vegetables (4-6 servings of vegetables per day)  6. Get a good night of sleep  7. Try to decrease stress in life     Please download apps:  1. \"My Fitness Pal\" (other option is Lose it)) to help you to monitor daily dietary intake and you will be able to see if you are eating the right amount of calories, protein, carbs                With My Fitness Pal-->When you set-up the rica or need to adjust settings:                Goals should include:                  Lose 1.5-2 lbs per week                Activity level: not very active (can't count exercise towards calorie number per day)                   ** Daily INPUT> Look at nutrition section-- \"nutrients\" and it will break down your macros for the day (ie. Protein, carbs, fibers, sugars and fats). Try to stay within these numbers daily     2. \"7 minute workout\" to help with exercise/activity which takes 7 minutes of your day and that you can do at home! 3. \"Calm\" or \"Headspace\" which helps with mindfulness, meditation, clarity, sleep, and job to your daily life. 4. [employer] blog for healthy recipe ideas  5. Truli for low carb resources    HIGH PROTEIN SNACK IDEAS  -cottage cheese  -plain yogurt  -kefir  -hard-boiled eggs  -natural cheeses  -nuts (measure portion size)   -unsweetened nut butters  -dried edamame   -el seeds soaked in water or almond milk  -soy nuts  -cured meats (monitor for sodium issues)   -hummus with vegetables  -bean dip with vegetables     FRUIT  Low carb fruit options   Raspberries: Half a cup (60 grams) contains 3 grams of carbs. Blackberries: Half a cup (70 grams) contains 4 grams of carbs. Strawberries: Half a cup (100 grams) contains 6 grams of carbs. Blueberries: Half a cup (50 grams) contains 6 grams of carbs. Plum: One medium-sized (80 grams) contains 6 grams of carbs.      VEGETABLES  Low carb vegetables

## 2022-07-21 ENCOUNTER — EKG ENCOUNTER (OUTPATIENT)
Dept: LAB | Age: 62
End: 2022-07-21
Attending: NURSE PRACTITIONER
Payer: COMMERCIAL

## 2022-07-21 DIAGNOSIS — Z51.81 THERAPEUTIC DRUG MONITORING: ICD-10-CM

## 2022-07-21 DIAGNOSIS — E66.01 CLASS 3 SEVERE OBESITY DUE TO EXCESS CALORIES WITH SERIOUS COMORBIDITY AND BODY MASS INDEX (BMI) OF 40.0 TO 44.9 IN ADULT (HCC): ICD-10-CM

## 2022-07-21 PROCEDURE — 93010 ELECTROCARDIOGRAM REPORT: CPT | Performed by: INTERNAL MEDICINE

## 2022-07-21 PROCEDURE — 93005 ELECTROCARDIOGRAM TRACING: CPT

## 2022-07-21 RX ORDER — SEMAGLUTIDE 2.68 MG/ML
INJECTION, SOLUTION SUBCUTANEOUS
Qty: 3 ML | Refills: 1 | Status: SHIPPED | OUTPATIENT
Start: 2022-07-21

## 2022-07-21 NOTE — TELEPHONE ENCOUNTER
Requesting ozempic 2 mg  LOV: 6/29/22  RTC: 8 weeks  Last Relevant Labs: 2/14/22  Filled: 5/31/22 #3ml with 1 refills    8/24/2022 12:00 PM Shoaib Dhaliwal APRN EMGWEI EM[facility] [address]

## 2022-07-22 LAB
ATRIAL RATE: 80 BPM
P AXIS: 55 DEGREES
P-R INTERVAL: 158 MS
Q-T INTERVAL: 358 MS
QRS DURATION: 70 MS
QTC CALCULATION (BEZET): 412 MS
R AXIS: -9 DEGREES
T AXIS: 23 DEGREES
VENTRICULAR RATE: 80 BPM

## 2022-07-25 ENCOUNTER — OFFICE VISIT (OUTPATIENT)
Dept: INTERNAL MEDICINE CLINIC | Facility: CLINIC | Age: 62
End: 2022-07-25
Payer: COMMERCIAL

## 2022-07-25 VITALS
RESPIRATION RATE: 16 BRPM | WEIGHT: 204.88 LBS | HEIGHT: 60 IN | SYSTOLIC BLOOD PRESSURE: 122 MMHG | DIASTOLIC BLOOD PRESSURE: 64 MMHG | OXYGEN SATURATION: 98 % | TEMPERATURE: 98 F | BODY MASS INDEX: 40.22 KG/M2 | HEART RATE: 97 BPM

## 2022-07-25 DIAGNOSIS — G62.9 NEUROPATHY: ICD-10-CM

## 2022-07-25 DIAGNOSIS — Z00.00 ROUTINE GENERAL MEDICAL EXAMINATION AT A HEALTH CARE FACILITY: Primary | ICD-10-CM

## 2022-07-25 DIAGNOSIS — Z12.31 ENCOUNTER FOR SCREENING MAMMOGRAM FOR BREAST CANCER: ICD-10-CM

## 2022-07-25 PROCEDURE — 3074F SYST BP LT 130 MM HG: CPT | Performed by: INTERNAL MEDICINE

## 2022-07-25 PROCEDURE — 99213 OFFICE O/P EST LOW 20 MIN: CPT | Performed by: INTERNAL MEDICINE

## 2022-07-25 PROCEDURE — 3008F BODY MASS INDEX DOCD: CPT | Performed by: INTERNAL MEDICINE

## 2022-07-25 PROCEDURE — 3078F DIAST BP <80 MM HG: CPT | Performed by: INTERNAL MEDICINE

## 2022-08-19 ENCOUNTER — TELEPHONE (OUTPATIENT)
Dept: INTERNAL MEDICINE CLINIC | Facility: CLINIC | Age: 62
End: 2022-08-19

## 2022-08-22 RX ORDER — TIRZEPATIDE 5 MG/.5ML
5 INJECTION, SOLUTION SUBCUTANEOUS WEEKLY
Qty: 2 ML | Refills: 0 | Status: SHIPPED | OUTPATIENT
Start: 2022-08-22

## 2022-08-22 NOTE — TELEPHONE ENCOUNTER
Ok, please ask patient if she would like to switch to mounjaro 5mg daily? And tell her how to use the coupon please.

## 2022-08-24 ENCOUNTER — OFFICE VISIT (OUTPATIENT)
Dept: INTERNAL MEDICINE CLINIC | Facility: CLINIC | Age: 62
End: 2022-08-24
Payer: COMMERCIAL

## 2022-08-24 VITALS
WEIGHT: 205 LBS | DIASTOLIC BLOOD PRESSURE: 80 MMHG | RESPIRATION RATE: 16 BRPM | SYSTOLIC BLOOD PRESSURE: 122 MMHG | OXYGEN SATURATION: 96 % | HEART RATE: 75 BPM | HEIGHT: 61.5 IN | BODY MASS INDEX: 38.21 KG/M2

## 2022-08-24 DIAGNOSIS — E11.9 TYPE 2 DIABETES MELLITUS WITHOUT COMPLICATION, WITHOUT LONG-TERM CURRENT USE OF INSULIN (HCC): ICD-10-CM

## 2022-08-24 DIAGNOSIS — E78.00 PURE HYPERCHOLESTEROLEMIA: ICD-10-CM

## 2022-08-24 DIAGNOSIS — G47.33 OSA (OBSTRUCTIVE SLEEP APNEA): ICD-10-CM

## 2022-08-24 DIAGNOSIS — Z51.81 THERAPEUTIC DRUG MONITORING: Primary | ICD-10-CM

## 2022-08-24 DIAGNOSIS — E66.01 CLASS 3 SEVERE OBESITY DUE TO EXCESS CALORIES WITH SERIOUS COMORBIDITY AND BODY MASS INDEX (BMI) OF 40.0 TO 44.9 IN ADULT (HCC): ICD-10-CM

## 2022-08-24 PROCEDURE — 99214 OFFICE O/P EST MOD 30 MIN: CPT | Performed by: NURSE PRACTITIONER

## 2022-08-24 PROCEDURE — 3074F SYST BP LT 130 MM HG: CPT | Performed by: NURSE PRACTITIONER

## 2022-08-24 PROCEDURE — 3008F BODY MASS INDEX DOCD: CPT | Performed by: NURSE PRACTITIONER

## 2022-08-24 PROCEDURE — 3079F DIAST BP 80-89 MM HG: CPT | Performed by: NURSE PRACTITIONER

## 2022-08-24 NOTE — PATIENT INSTRUCTIONS
We are here to support you with weight loss, but please remember that you still need your primary care provider for your routine health maintenance. PLAN:  Will continue with mounjaro 5mg weekly x 3 more weeks and then increase to 7.5mg weekly   Follow up with me in 8-12 weeks  Schedule follow up appointments: Marline Schneider (dietitian) or Josh Ch (presurgery dietitian)   Check for insurance coverage for dietitian and labwork prior to scheduling appointment. Please try to work on the following dietary changes:  1. Goals: Aim for 20-30 grams of protein/ meal  i. Aim for 100 grams of carbohydrates/day  ii. Eat 4-6 vegetables/day  iii. Avoid skipping meals- eat every 4-5 hours  iv. Aim for 3 meals/day  2. Drink lots of water and cut down on soda/juice consumption if soda/juice drinker  3. Focus on protein: (15-30 grams with each meal) ie. greek yogurt, cottage cheese, string cheese, hard boiled eggs  4. Healthy snacks: peanut butter and apples, hummus and carrots, berries, nuts (1/4 cup), tuna and crackers                 Protein Shakes: Premier protein or Core Power                Protein Bars: Rx Bars, Oatmega, Power Crunch                 Sargento balanced breaks (cheese and nuts)- without chocolate  5. Reduce carbohydrates which includes sweets as well as rice, pasta, potatoes, bread, corn and instead choose whole grain options or more protein or vegetables (4-6 servings of vegetables per day)  6. Get a good night of sleep  7. Try to decrease stress in life     Please download apps:  1. \"My Fitness Pal\" (other option is Lose it)) to help you to monitor daily dietary intake and you will be able to see if you are eating the right amount of calories, protein, carbs                With My Fitness Pal-->When you set-up the rica or need to adjust settings:                Goals should include:                  Lose 1.5-2 lbs per week                Activity level: not very active (can't count exercise towards calorie number per day)                   ** Daily INPUT> Look at nutrition section-- \"nutrients\" and it will break down your macros for the day (ie. Protein, carbs, fibers, sugars and fats). Try to stay within these numbers daily     2. \"7 minute workout\" to help with exercise/activity which takes 7 minutes of your day and that you can do at home! 3. \"Calm\" or \"Headspace\" which helps with mindfulness, meditation, clarity, sleep, and job to your daily life. 4. [employer] blog for healthy recipe ideas  5. [employer] for low carb resources    HIGH PROTEIN SNACK IDEAS  -cottage cheese  -plain yogurt  -kefir  -hard-boiled eggs  -natural cheeses  -nuts (measure portion size)   -unsweetened nut butters  -dried edamame   -el seeds soaked in water or almond milk  -soy nuts  -cured meats (monitor for sodium issues)   -hummus with vegetables  -bean dip with vegetables     FRUIT  Low carb fruit options   Raspberries: Half a cup (60 grams) contains 3 grams of carbs. Blackberries: Half a cup (70 grams) contains 4 grams of carbs. Strawberries: Half a cup (100 grams) contains 6 grams of carbs. Blueberries: Half a cup (50 grams) contains 6 grams of carbs. Plum: One medium-sized (80 grams) contains 6 grams of carbs.      VEGETABLES  Low carb vegetables

## 2022-09-14 ENCOUNTER — PATIENT MESSAGE (OUTPATIENT)
Dept: INTERNAL MEDICINE CLINIC | Facility: CLINIC | Age: 62
End: 2022-09-14

## 2022-09-14 RX ORDER — TIRZEPATIDE 7.5 MG/.5ML
7.5 INJECTION, SOLUTION SUBCUTANEOUS WEEKLY
Qty: 2 ML | Refills: 0 | Status: SHIPPED | OUTPATIENT
Start: 2022-09-14

## 2022-09-14 NOTE — TELEPHONE ENCOUNTER
Requesting mounjaro increase  LOV: 8/24/22  RTC: 8-12 weeks  Last Relevant Labs: 2/14/22  Filled: 8/22/22 #2ml with 0 refills  Mounjaro 5 mg    10/19/2022 12:40 PM Logan Dhaliwal APRN EMGQUINNI EM[facility] [address]

## 2022-09-15 ENCOUNTER — HOSPITAL ENCOUNTER (OUTPATIENT)
Dept: MAMMOGRAPHY | Age: 62
Discharge: HOME | End: 2022-09-15
Attending: INTERNAL MEDICINE
Payer: COMMERCIAL

## 2022-09-15 DIAGNOSIS — Z12.31 ENCOUNTER FOR SCREENING MAMMOGRAM FOR BREAST CANCER: ICD-10-CM

## 2022-09-15 PROCEDURE — 77063 BREAST TOMOSYNTHESIS BI: CPT | Performed by: INTERNAL MEDICINE

## 2022-09-15 PROCEDURE — 77067 SCR MAMMO BI INCL CAD: CPT | Performed by: INTERNAL MEDICINE

## 2022-09-20 DIAGNOSIS — E78.00 PURE HYPERCHOLESTEROLEMIA: ICD-10-CM

## 2022-09-20 RX ORDER — ATORVASTATIN CALCIUM 20 MG/1
20 TABLET, FILM COATED ORAL DAILY
Qty: 90 TABLET | Refills: 0 | Status: SHIPPED | OUTPATIENT
Start: 2022-09-20

## 2022-10-03 ENCOUNTER — PATIENT MESSAGE (OUTPATIENT)
Dept: INTERNAL MEDICINE CLINIC | Facility: CLINIC | Age: 62
End: 2022-10-03

## 2022-10-03 RX ORDER — TIRZEPATIDE 10 MG/.5ML
10 INJECTION, SOLUTION SUBCUTANEOUS WEEKLY
Qty: 2 ML | Refills: 0 | Status: SHIPPED | OUTPATIENT
Start: 2022-10-03

## 2022-10-03 NOTE — TELEPHONE ENCOUNTER
Requesting MOUNJARO DOSE INCREASE   LOV 8/24/22  RTC: 12 weeks   Last Relevant Labs:   Filled: 2ml on 9/14/22 # with 0  refills    Future Appointments   Date Time Provider David Yo   10/19/2022 12:40 PM EARLE Benavidez [facility] [address]   10/26/2022 12:40 PM Vanda Dunn MD EMG [facility]     Pt is ready to increase the dose.

## 2022-10-17 LAB
ALBUMIN/GLOBULIN RATIO: 1.6 (CALC) (ref 1–2.5)
ALBUMIN: 4.2 G/DL (ref 3.6–5.1)
ALKALINE PHOSPHATASE: 100 U/L (ref 37–153)
ALT: 19 U/L (ref 6–29)
AST: 13 U/L (ref 10–35)
BILIRUBIN, TOTAL: 0.9 MG/DL (ref 0.2–1.2)
BUN: 14 MG/DL (ref 7–25)
CALCIUM: 9.6 MG/DL (ref 8.6–10.4)
CARBON DIOXIDE: 29 MMOL/L (ref 20–32)
CHLORIDE: 105 MMOL/L (ref 98–110)
CHOL/HDLC RATIO: 2.7 (CALC)
CHOLESTEROL, TOTAL: 148 MG/DL
CREATININE: 0.57 MG/DL (ref 0.5–1.05)
EGFR: 103 ML/MIN/1.73M2
GLOBULIN: 2.7 G/DL (CALC) (ref 1.9–3.7)
GLUCOSE: 92 MG/DL (ref 65–99)
HDL CHOLESTEROL: 55 MG/DL
LDL-CHOLESTEROL: 70 MG/DL (CALC)
NON-HDL CHOLESTEROL: 93 MG/DL (CALC)
POTASSIUM: 4.3 MMOL/L (ref 3.5–5.3)
PROTEIN, TOTAL: 6.9 G/DL (ref 6.1–8.1)
SODIUM: 140 MMOL/L (ref 135–146)
TRIGLYCERIDES: 157 MG/DL

## 2022-10-17 PROCEDURE — 3044F HG A1C LEVEL LT 7.0%: CPT | Performed by: NURSE PRACTITIONER

## 2022-10-18 LAB
ABSOLUTE BASOPHILS: 32 CELLS/UL (ref 0–200)
ABSOLUTE EOSINOPHILS: 256 CELLS/UL (ref 15–500)
ABSOLUTE LYMPHOCYTES: 2320 CELLS/UL (ref 850–3900)
ABSOLUTE MONOCYTES: 488 CELLS/UL (ref 200–950)
ABSOLUTE NEUTROPHILS: 4904 CELLS/UL (ref 1500–7800)
ALBUMIN/GLOBULIN RATIO: 1.6 (CALC) (ref 1–2.5)
ALBUMIN: 4.2 G/DL (ref 3.6–5.1)
ALKALINE PHOSPHATASE: 100 U/L (ref 37–153)
ALT: 19 U/L (ref 6–29)
AST: 13 U/L (ref 10–35)
BASOPHILS: 0.4 %
BILIRUBIN, TOTAL: 0.9 MG/DL (ref 0.2–1.2)
BUN: 14 MG/DL (ref 7–25)
CALCIUM: 9.6 MG/DL (ref 8.6–10.4)
CARBON DIOXIDE: 29 MMOL/L (ref 20–32)
CHLORIDE: 105 MMOL/L (ref 98–110)
CHOL/HDLC RATIO: 2.7 (CALC)
CHOLESTEROL, TOTAL: 148 MG/DL
CREATININE: 0.57 MG/DL (ref 0.5–1.05)
EGFR: 103 ML/MIN/1.73M2
EOSINOPHILS: 3.2 %
GLOBULIN: 2.7 G/DL (CALC) (ref 1.9–3.7)
GLUCOSE: 92 MG/DL (ref 65–99)
HDL CHOLESTEROL: 55 MG/DL
HEMATOCRIT: 42.9 % (ref 35–45)
HEMOGLOBIN A1C: 5.5 % OF TOTAL HGB
HEMOGLOBIN: 14.4 G/DL (ref 11.7–15.5)
LDL-CHOLESTEROL: 70 MG/DL (CALC)
LYMPHOCYTES: 29 %
MCH: 30.1 PG (ref 27–33)
MCHC: 33.6 G/DL (ref 32–36)
MCV: 89.6 FL (ref 80–100)
MONOCYTES: 6.1 %
MPV: 9.4 FL (ref 7.5–12.5)
NEUTROPHILS: 61.3 %
NON-HDL CHOLESTEROL: 93 MG/DL (CALC)
PLATELET COUNT: 319 THOUSAND/UL (ref 140–400)
POTASSIUM: 4.3 MMOL/L (ref 3.5–5.3)
PROTEIN, TOTAL: 6.9 G/DL (ref 6.1–8.1)
RDW: 12.2 % (ref 11–15)
RED BLOOD CELL COUNT: 4.79 MILLION/UL (ref 3.8–5.1)
SODIUM: 140 MMOL/L (ref 135–146)
TRIGLYCERIDES: 157 MG/DL
TSH W/REFLEX TO FT4: 1.27 MIU/L (ref 0.4–4.5)
WHITE BLOOD CELL COUNT: 8 THOUSAND/UL (ref 3.8–10.8)

## 2022-10-19 ENCOUNTER — OFFICE VISIT (OUTPATIENT)
Dept: INTERNAL MEDICINE CLINIC | Facility: CLINIC | Age: 62
End: 2022-10-19
Payer: COMMERCIAL

## 2022-10-19 VITALS
OXYGEN SATURATION: 94 % | WEIGHT: 205 LBS | DIASTOLIC BLOOD PRESSURE: 76 MMHG | HEART RATE: 77 BPM | SYSTOLIC BLOOD PRESSURE: 128 MMHG | HEIGHT: 61 IN | BODY MASS INDEX: 38.71 KG/M2 | RESPIRATION RATE: 16 BRPM

## 2022-10-19 DIAGNOSIS — Z51.81 THERAPEUTIC DRUG MONITORING: Primary | ICD-10-CM

## 2022-10-19 DIAGNOSIS — G47.33 OSA (OBSTRUCTIVE SLEEP APNEA): ICD-10-CM

## 2022-10-19 DIAGNOSIS — E78.00 PURE HYPERCHOLESTEROLEMIA: ICD-10-CM

## 2022-10-19 DIAGNOSIS — E66.9 OBESITY (BMI 30-39.9): ICD-10-CM

## 2022-10-19 DIAGNOSIS — E11.9 TYPE 2 DIABETES MELLITUS WITHOUT COMPLICATION, WITHOUT LONG-TERM CURRENT USE OF INSULIN (HCC): ICD-10-CM

## 2022-10-19 PROCEDURE — 3078F DIAST BP <80 MM HG: CPT | Performed by: NURSE PRACTITIONER

## 2022-10-19 PROCEDURE — 3008F BODY MASS INDEX DOCD: CPT | Performed by: NURSE PRACTITIONER

## 2022-10-19 PROCEDURE — 3074F SYST BP LT 130 MM HG: CPT | Performed by: NURSE PRACTITIONER

## 2022-10-19 PROCEDURE — 99213 OFFICE O/P EST LOW 20 MIN: CPT | Performed by: NURSE PRACTITIONER

## 2022-10-26 ENCOUNTER — OFFICE VISIT (OUTPATIENT)
Dept: INTERNAL MEDICINE CLINIC | Facility: CLINIC | Age: 62
End: 2022-10-26
Payer: COMMERCIAL

## 2022-10-26 VITALS
TEMPERATURE: 98 F | OXYGEN SATURATION: 96 % | DIASTOLIC BLOOD PRESSURE: 76 MMHG | BODY MASS INDEX: 38.51 KG/M2 | HEART RATE: 80 BPM | HEIGHT: 61 IN | SYSTOLIC BLOOD PRESSURE: 120 MMHG | WEIGHT: 204 LBS | RESPIRATION RATE: 20 BRPM

## 2022-10-26 DIAGNOSIS — D22.9 NUMEROUS MOLES: ICD-10-CM

## 2022-10-26 DIAGNOSIS — Z00.00 PHYSICAL EXAM, ANNUAL: ICD-10-CM

## 2022-10-26 DIAGNOSIS — G47.33 OSA (OBSTRUCTIVE SLEEP APNEA): ICD-10-CM

## 2022-10-26 DIAGNOSIS — Z12.31 ENCOUNTER FOR SCREENING MAMMOGRAM FOR MALIGNANT NEOPLASM OF BREAST: ICD-10-CM

## 2022-10-26 DIAGNOSIS — E11.9 TYPE 2 DIABETES MELLITUS WITHOUT COMPLICATION, WITHOUT LONG-TERM CURRENT USE OF INSULIN (HCC): ICD-10-CM

## 2022-10-26 DIAGNOSIS — Z23 NEED FOR VACCINATION: ICD-10-CM

## 2022-10-26 DIAGNOSIS — Z86.19 HISTORY OF COLD SORES: ICD-10-CM

## 2022-10-26 DIAGNOSIS — E78.00 PURE HYPERCHOLESTEROLEMIA: ICD-10-CM

## 2022-10-26 PROCEDURE — 99396 PREV VISIT EST AGE 40-64: CPT | Performed by: INTERNAL MEDICINE

## 2022-10-26 PROCEDURE — 3008F BODY MASS INDEX DOCD: CPT | Performed by: INTERNAL MEDICINE

## 2022-10-26 PROCEDURE — 90686 IIV4 VACC NO PRSV 0.5 ML IM: CPT | Performed by: INTERNAL MEDICINE

## 2022-10-26 PROCEDURE — 3074F SYST BP LT 130 MM HG: CPT | Performed by: INTERNAL MEDICINE

## 2022-10-26 PROCEDURE — 90471 IMMUNIZATION ADMIN: CPT | Performed by: INTERNAL MEDICINE

## 2022-10-26 PROCEDURE — 3078F DIAST BP <80 MM HG: CPT | Performed by: INTERNAL MEDICINE

## 2022-10-26 RX ORDER — VALACYCLOVIR HYDROCHLORIDE 1 G/1
1000 TABLET, FILM COATED ORAL EVERY 12 HOURS SCHEDULED
Qty: 2 TABLET | Refills: 1 | Status: SHIPPED | OUTPATIENT
Start: 2022-10-26

## 2022-11-02 NOTE — TELEPHONE ENCOUNTER
Requesting Mounjaro  LOV: 10/19/22  RTC: not noted  Last Relevant Labs: 10/17/22  Filled: 10/3/22 #2ml with 0 refills mounjaro 10 mg    Future Appointments   Date Time Provider David Yo   1/3/2023 12:40 PM EARLE Dykes [facility] [address]     Pt wants to remain on same dose

## 2022-11-03 RX ORDER — TIRZEPATIDE 10 MG/.5ML
10 INJECTION, SOLUTION SUBCUTANEOUS WEEKLY
Qty: 2 ML | Refills: 0 | Status: SHIPPED | OUTPATIENT
Start: 2022-11-03

## 2022-11-09 ENCOUNTER — PATIENT MESSAGE (OUTPATIENT)
Dept: INTERNAL MEDICINE CLINIC | Facility: CLINIC | Age: 62
End: 2022-11-09

## 2022-11-09 DIAGNOSIS — E11.9 TYPE 2 DIABETES MELLITUS WITHOUT COMPLICATION, WITHOUT LONG-TERM CURRENT USE OF INSULIN (HCC): Primary | ICD-10-CM

## 2022-11-09 RX ORDER — TIRZEPATIDE 10 MG/.5ML
10 INJECTION, SOLUTION SUBCUTANEOUS WEEKLY
Qty: 2 ML | Refills: 0 | Status: SHIPPED | OUTPATIENT
Start: 2022-11-09

## 2022-12-07 DIAGNOSIS — E11.9 TYPE 2 DIABETES MELLITUS WITHOUT COMPLICATION, WITHOUT LONG-TERM CURRENT USE OF INSULIN (HCC): ICD-10-CM

## 2022-12-08 NOTE — TELEPHONE ENCOUNTER
Requesting Mounjaro  LOV: 10/19/22  RTC: not noted  Last Relevant Labs: 10/17/22  Filled: 11/9/22  #2ml with 0 refills  Mounjaro 10 mg    Future Appointments   Date Time Provider David Yo   1/3/2023 12:40 PM Westley Ramirez APRN EMGQUINNI EM[facility] [address]

## 2022-12-11 RX ORDER — TIRZEPATIDE 10 MG/.5ML
10 INJECTION, SOLUTION SUBCUTANEOUS WEEKLY
Qty: 2 ML | Refills: 0 | Status: SHIPPED | OUTPATIENT
Start: 2022-12-11

## 2022-12-13 RX ORDER — TIRZEPATIDE 10 MG/.5ML
10 INJECTION, SOLUTION SUBCUTANEOUS WEEKLY
Qty: 2 ML | Refills: 0 | OUTPATIENT
Start: 2022-12-13

## 2022-12-17 DIAGNOSIS — E78.00 PURE HYPERCHOLESTEROLEMIA: ICD-10-CM

## 2022-12-17 RX ORDER — ATORVASTATIN CALCIUM 20 MG/1
TABLET, FILM COATED ORAL
Qty: 90 TABLET | Refills: 0 | Status: SHIPPED | OUTPATIENT
Start: 2022-12-17

## 2022-12-17 NOTE — TELEPHONE ENCOUNTER
Per dr. Nish Diaz last office visit note 10/26/22 Return in about 1 year (around 10/26/2023) for physical. do labs in 6 months  90/0 sent per protocol

## 2023-01-03 ENCOUNTER — OFFICE VISIT (OUTPATIENT)
Dept: INTERNAL MEDICINE CLINIC | Facility: CLINIC | Age: 63
End: 2023-01-03
Payer: COMMERCIAL

## 2023-01-03 VITALS
DIASTOLIC BLOOD PRESSURE: 64 MMHG | RESPIRATION RATE: 16 BRPM | BODY MASS INDEX: 37.76 KG/M2 | WEIGHT: 200 LBS | HEART RATE: 80 BPM | HEIGHT: 61 IN | SYSTOLIC BLOOD PRESSURE: 118 MMHG | OXYGEN SATURATION: 96 %

## 2023-01-03 DIAGNOSIS — E66.9 OBESITY (BMI 30-39.9): ICD-10-CM

## 2023-01-03 DIAGNOSIS — G47.33 OSA (OBSTRUCTIVE SLEEP APNEA): ICD-10-CM

## 2023-01-03 DIAGNOSIS — Z51.81 THERAPEUTIC DRUG MONITORING: Primary | ICD-10-CM

## 2023-01-03 DIAGNOSIS — E11.9 TYPE 2 DIABETES MELLITUS WITHOUT COMPLICATION, WITHOUT LONG-TERM CURRENT USE OF INSULIN (HCC): ICD-10-CM

## 2023-01-03 DIAGNOSIS — E78.00 PURE HYPERCHOLESTEROLEMIA: ICD-10-CM

## 2023-01-03 PROCEDURE — 3078F DIAST BP <80 MM HG: CPT | Performed by: NURSE PRACTITIONER

## 2023-01-03 PROCEDURE — 99214 OFFICE O/P EST MOD 30 MIN: CPT | Performed by: NURSE PRACTITIONER

## 2023-01-03 PROCEDURE — 3008F BODY MASS INDEX DOCD: CPT | Performed by: NURSE PRACTITIONER

## 2023-01-03 PROCEDURE — 3074F SYST BP LT 130 MM HG: CPT | Performed by: NURSE PRACTITIONER

## 2023-01-03 RX ORDER — TIRZEPATIDE 12.5 MG/.5ML
12.5 INJECTION, SOLUTION SUBCUTANEOUS WEEKLY
Qty: 2 ML | Refills: 0 | Status: SHIPPED | OUTPATIENT
Start: 2023-01-03

## 2023-01-03 NOTE — PATIENT INSTRUCTIONS
Next steps:  1. Fill your prescribed medication and take as discussed and prescribed: mounjaro 12.5mg weekly   2. Schedule a personal nutrition consultation with one of our registered dieticians     Please try to work on the following dietary changes:    1. Drink water with meals and throughout the day, cut down on soda and/or juice if consumed. Consider flavored water options like Bubbly, Spindrift, Hint and Sue. 2.  Eat breakfast daily and focus on having protein with each meal, examples include: greek yogurt, cottage cheese, hard boiled egg, whole grain toast with peanut butter. 3.  Reduce refined carbohydrates and sugars which includes items such as sweets, as well as rice, pasta, and bread and make sure to choose whole grain options when having them with just 1 serving per meal about the size of your inner palm. 4.  Consume non starchy veggies daily working towards making them a good 50% of your daily food intake. Add them to lunch and dinner consistently. 5.  Start a daily probiotic: VSL#3 is recommended, (order on line at www.vsl3. com). Take 1 capsule daily with water for 30 days, then reduce to 1 every other day (this will reduce the cost). Capsules can be left out for 2 weeks, but then must be refrigerated. Please download rica Vivint Fitness Christ Kayser! Or Net Diary to monitor daily dietary intake and you will be able to see if you are eating the right amount of calories or too much or too little which would hinder weight loss. Additionally this will help to see your daily carbohydrate and protein intake. When you set the rica up choose 1-2 lbs/week as a goal.  Keeping a paper food journal is an option as well to remain accountable for your choices- this is the start to mindful eating! A low calorie diet has been consistently shown to support weight loss. Continue or start exercising to help establish a routine.  If not already exercising begin with 1 day and progress as able with long-term goal of 30 minutes 5 days a week at a minimum. Meditation daily can help manage and control stress. Chronic stress can make weight loss difficult. Exercising is one way to help with stress, but meditation using the CALM Triston or another comparable alternative can be done in your home or place of work with little time commitment. This Triston can also help work on behavior change and improve sleep. Check out the segment under Calm Masterclass and listen to The 4 Pillars of Health. A great way to begin learning about the foundation of lifestyle with practical tips to use in your every day. Check out www.yourweightmatters. org blog for continued daily support and education along this weight loss journey! Patient Resources:     Personal Training/Fitness Classes/Health Coaching     Keisha Chou and Sandoval Sophiaside @ [email or web address] Full fitness center with group fitness and personal training. Discount available as client of [facility]. Health Coaching and Personal Training with Nette Claire at our [facility]- individual weekly coaching with option to add personal training and small group fitness classes targeted at weight loss- [phone] and/or email @ [email or web address]  360FIT Cuddy [email or web address] Group Fitness [phone] and/or email Bruno Lucero at [email or web address]  [address] [facility] with multiple locations: Aetna (www.Evolero), In[employer] The mymission2 Fitness (www.Rawporter. Garden Price), Fit Body Bootcamp (www.paymiobodybootcamp.Garden Price), MySiteApp (www.PresseTrends.com. Garden Price), The Exercise  (www.exercisecoach.Garden Price)     Online Fitness  Fitness  on [employer] in 10 DVD series- www. vbjnj01YQS. Garden Price  Sit and Be Fit - Chair exercise series Www.sitandbefit. org  Hip Hop Fit with Kwame Anglin at www.hiphopfit. net     Apps for on the [employer] 7 Minute Workout (orange box with white 7) - free on the go HIIT training triston  Peloton Triston @ www. Tracab     Nutrition Trackers and Tools  LoseIT! And My Fitness Pal apps and on line for tracking nutrition  NOOM - virtual health coaching  FitFoundation (healthy meals on the go) in [facility] @ www. tnywumeuwwgrw4v. Gilmar Hoff MD @ www.ChaChadKleermail and Gwenith Goldberg (keto and low carb plans recommended) @ www. [email or web address].KEW, Metabolic Meals @ www. Canvas NetworksMetabolicMeals. com - individual prepared meals to go  [employer], [employer], [employer], Every Plate, MetaLogics- on line meal delivery programs for preparation at home  AK Railpod in [facility] for homemade meals to go @ www.Bicon Pharmaceutical  Diet Doctor @ www. dietdoctor. Workstreamer - low carb swaps  Simtrol - meal prep and planning triston (www.yummly. com)     Stress Management/Behavior/Mindful Eating  CALM meditation triston (www.Ecorithm)  Headspace  Am I Hungry? Mindful eating virtual  triston  Www.yourweightmatters. org - Obesity Action Coalition sponsored Blog posts daily  Motivation triston (black box with white \")- daily supportive messages sent to your phone     Books/Video Education/Podcasts  Mindless Eating by Christiana Martinez  Why We Get Sick by Deneen Mariano (a book about insulin resistance)  Atomic Habits by Alexa Coffman (a book about taking small steps to promote greater behavior change)   Can't Hurt Me by Karuna Voss (a book exploring the power of discipline in achieving your goals)  The End of Dieting: How to Live for Life by Dr. Alejandro De La Cruz M.D. or listen to The [facility] Episode 61: Understanding \"Nutritarian\" Eating w/Dr. Alejandro De La Cruz  Your Body in Balance: The [employer] of Food, Hormones, and Health by Dr. Max Knight  The Menopause Diet Plan by Reji Cortez and [facility] AT [facility]  The Complete Guide to fasting by Dr. Janet Bojorquez, [employer] by Joshua Corrales, Ph.D, R.D.   Weight Loss Surgery Will Not Treat Food Addiction by Jessica Dietz Ph.D  The Game Changers- Tomflreid Documentary on plant based nutrition  Fed Up - documentary about obesity (Free on Utube)  The Truth About Sugar - documentary on sugar (Free on Utube, https://youtu. be/2T3qzttVC2f)  The Dr. Imelda Pringle by Dr. Ezequiel Heck MD  Fitlosophy Fitspiration - journal to better health (found at [employer] in fitness aisle)  What Happened to You?- a look at the impact trauma has on behavior written by Boris Méndez and Dr. Raelyn Kanner Again by Nikole Da Silva - discovering your true self after trauma  Yumiko Horne talk on Inktank, The Call to Courage  Podcasts: The Exam Room by the Physician's Committee, Nutrition Facts by Dr. Luis Schmitz    We are here to support you with weight loss, but please remember that you still need your primary care provider for your routine health maintenance.

## 2023-01-09 DIAGNOSIS — Z86.19 HISTORY OF COLD SORES: ICD-10-CM

## 2023-01-10 RX ORDER — VALACYCLOVIR HYDROCHLORIDE 1 G/1
1000 TABLET, FILM COATED ORAL EVERY 12 HOURS SCHEDULED
Qty: 2 TABLET | Refills: 1 | Status: SHIPPED | OUTPATIENT
Start: 2023-01-10

## 2023-01-11 ENCOUNTER — TELEMEDICINE (OUTPATIENT)
Dept: INTERNAL MEDICINE CLINIC | Facility: CLINIC | Age: 63
End: 2023-01-11
Payer: COMMERCIAL

## 2023-01-11 DIAGNOSIS — U07.1 COVID-19 VIRUS INFECTION: Primary | ICD-10-CM

## 2023-01-11 PROCEDURE — 99214 OFFICE O/P EST MOD 30 MIN: CPT | Performed by: NURSE PRACTITIONER

## 2023-01-11 RX ORDER — NIRMATRELVIR AND RITONAVIR 300-100 MG
KIT ORAL
Qty: 30 TABLET | Refills: 0 | Status: SHIPPED | OUTPATIENT
Start: 2023-01-11

## 2023-02-06 RX ORDER — TIRZEPATIDE 12.5 MG/.5ML
12.5 INJECTION, SOLUTION SUBCUTANEOUS WEEKLY
Qty: 2 ML | Refills: 0 | Status: SHIPPED | OUTPATIENT
Start: 2023-02-06

## 2023-02-15 ENCOUNTER — PATIENT MESSAGE (OUTPATIENT)
Dept: INTERNAL MEDICINE CLINIC | Facility: CLINIC | Age: 63
End: 2023-02-15

## 2023-02-15 RX ORDER — TIRZEPATIDE 10 MG/.5ML
10 INJECTION, SOLUTION SUBCUTANEOUS WEEKLY
Qty: 2 ML | Refills: 0 | Status: SHIPPED | OUTPATIENT
Start: 2023-02-15 | End: 2023-02-16

## 2023-02-16 ENCOUNTER — PATIENT MESSAGE (OUTPATIENT)
Dept: INTERNAL MEDICINE CLINIC | Facility: CLINIC | Age: 63
End: 2023-02-16

## 2023-02-16 DIAGNOSIS — E11.9 TYPE 2 DIABETES MELLITUS WITHOUT COMPLICATION, WITHOUT LONG-TERM CURRENT USE OF INSULIN (HCC): Primary | ICD-10-CM

## 2023-02-16 RX ORDER — TIRZEPATIDE 10 MG/.5ML
10 INJECTION, SOLUTION SUBCUTANEOUS WEEKLY
Qty: 2 ML | Refills: 0 | Status: CANCELLED
Start: 2023-02-16

## 2023-02-17 DIAGNOSIS — E78.00 PURE HYPERCHOLESTEROLEMIA: ICD-10-CM

## 2023-02-17 RX ORDER — ATORVASTATIN CALCIUM 20 MG/1
TABLET, FILM COATED ORAL
Qty: 90 TABLET | Refills: 2 | Status: SHIPPED | OUTPATIENT
Start: 2023-02-17

## 2023-02-17 RX ORDER — TIRZEPATIDE 10 MG/.5ML
10 INJECTION, SOLUTION SUBCUTANEOUS WEEKLY
Qty: 2 ML | Refills: 0 | Status: SHIPPED | OUTPATIENT
Start: 2023-02-17 | End: 2023-02-17

## 2023-03-17 ENCOUNTER — PATIENT MESSAGE (OUTPATIENT)
Dept: INTERNAL MEDICINE CLINIC | Facility: CLINIC | Age: 63
End: 2023-03-17

## 2023-03-17 DIAGNOSIS — S99.911A INJURY OF RIGHT ANKLE, INITIAL ENCOUNTER: Primary | ICD-10-CM

## 2023-03-17 NOTE — TELEPHONE ENCOUNTER
From: Eusebio Hoang  To: James Valentine MD  Sent: 3/17/2023 11:32 AM CDT  Subject: Orthopaedic     Dr. Micah Flores, I broke my right ankle in [place]. I stepped off a curb and fell on Friday March 3,2023. I went to the emergency rm at [facility]. They had to set my ankle. I have tried to link my medical info from [place] to [place]. I have seen Dr. Rina Dalal of [address] [place] [address] and sports medicine. I have decided to have my surgery & recovery here in [place]. My  can work remotely & we are staying in a ranch house. I am scheduled for surgery next Wednesday 3-. I wanted to update you and request a referral for an orthopedic doctor who can continue with my recovery after my 8 week recovery in [place]. I know I will need physical therapy and additional medical follow up when I get back to [place]. If you could provide a couple of recommendations in my [facility] network the would be greatly appreciated.    Thank you  Juan Manuel Perez

## 2023-03-17 NOTE — TELEPHONE ENCOUNTER
Spoke to [facility], they advised the patient can be seen by either  or Amanda [place]. Referral placed and patient notified via [facility].

## 2023-03-17 NOTE — TELEPHONE ENCOUNTER
Please call [facility] orth[facility] and see who would be appropriate for pt to see for follow up of ankle fracture.

## 2023-03-31 NOTE — TELEPHONE ENCOUNTER
Requesting mounjaro  LOV: 1/3/23  RTC: 8 weeks   Last Relevant Labs:   Filled:2ml on 2/17/23 # with 0  refills    Future Appointments   Date Time Provider David Yo   6/7/2023  3:40 PM Lucillie Melody, Claude App, EARLE EMGQUINNI [facility]

## 2023-04-02 RX ORDER — TIRZEPATIDE 10 MG/.5ML
10 INJECTION, SOLUTION SUBCUTANEOUS WEEKLY
Qty: 2 ML | Refills: 0 | Status: SHIPPED | OUTPATIENT
Start: 2023-04-02

## 2023-04-06 PROBLEM — S82.852A CLOSED TRIMALLEOLAR FRACTURE OF LEFT ANKLE: Status: ACTIVE | Noted: 2023-03-16

## 2023-04-10 ENCOUNTER — TELEPHONE (OUTPATIENT)
Dept: INTERNAL MEDICINE CLINIC | Facility: CLINIC | Age: 63
End: 2023-04-10

## 2023-04-10 NOTE — TELEPHONE ENCOUNTER
Pt called regarding constipation and bleeding with BMs. Spoke to patient, she has been constipated post surgery, taking PRNs and hasn't had BM in 2-3 days. Finally had BM today but noted \"a lot\" of blood both in the toilet bowl and when wiping. No hemorrhoids noted, no abd pain, n/d/v, fever, lightheadedness/dizziness. Not taking any blood thinning medications. Pt currently in [address] [place] [address], advised to go to local [facility] for further eval, she verbalized understanding.

## 2023-04-27 ENCOUNTER — PATIENT MESSAGE (OUTPATIENT)
Dept: INTERNAL MEDICINE CLINIC | Facility: CLINIC | Age: 63
End: 2023-04-27

## 2023-04-27 NOTE — TELEPHONE ENCOUNTER
From: Tiera Mon  To: Ne Silva MD  Sent: 4/27/2023 2:25 PM CDT  Subject: Microabumin Creatinine Ratio    Dr. Alma Lopez, I believe I am due to have this test. Can you add it to my current [facility] lab work? I am planning on getting that lab work done in the next couple of weeks. If this does not sound correct just let me know and I can do it at a later date if necessary.   Teri Schuler

## 2023-05-02 LAB
AMB EXT BILIRUBIN, TOTAL: 1.4 MG/DL
AMB EXT BUN: 18 MG/DL
AMB EXT CALCIUM: 9.8
AMB EXT CARBON DIOXIDE: 28
AMB EXT CHLORIDE: 104
AMB EXT CHOL/HDL RATIO: 2.7
AMB EXT CHOLESTEROL, TOTAL: 158 MG/DL
AMB EXT CMP ALT: 20 U/L
AMB EXT CMP AST: 17 U/L
AMB EXT CREATININE, URINE: 136 MG/DL
AMB EXT CREATININE: 0.6 MG/DL
AMB EXT EGFR NON-AA: 101
AMB EXT GLUCOSE: 86 MG/DL
AMB EXT HDL CHOLESTEROL: 58 MG/DL
AMB EXT HGBA1C: 5.3 %
AMB EXT LDL CHOLESTEROL, DIRECT: 79 MG/DL
AMB EXT MALB URINE CALC: 1 MG/24HR
AMB EXT MALB/CRE CALC: 6 UG/MG
AMB EXT NON HDL CHOL: 100 MG/DL
AMB EXT POSTASSIUM: 4.1 MMOL/L
AMB EXT SODIUM: 140 MMOL/L
AMB EXT TOTAL PROTEIN: 7.2
AMB EXT TRIGLYCERIDES: 114 MG/DL

## 2023-05-03 ENCOUNTER — TELEPHONE (OUTPATIENT)
Dept: INTERNAL MEDICINE CLINIC | Facility: CLINIC | Age: 63
End: 2023-05-03

## 2023-05-03 ENCOUNTER — PATIENT MESSAGE (OUTPATIENT)
Dept: INTERNAL MEDICINE CLINIC | Facility: CLINIC | Age: 63
End: 2023-05-03

## 2023-05-03 DIAGNOSIS — E11.9 TYPE 2 DIABETES MELLITUS WITHOUT COMPLICATION, WITHOUT LONG-TERM CURRENT USE OF INSULIN (HCC): Primary | ICD-10-CM

## 2023-05-03 DIAGNOSIS — E78.00 PURE HYPERCHOLESTEROLEMIA: ICD-10-CM

## 2023-05-03 LAB
AMB EXT CHOLESTEROL, TOTAL: 158 MG/DL
AMB EXT CREATININE, URINE: 136 MG/DL
AMB EXT HDL CHOLESTEROL: 58 MG/DL
AMB EXT LDL CHOLESTEROL, DIRECT: 7 MG/DL
AMB EXT MALB URINE CALC: 1 MG/24HR
AMB EXT MALB/CRE CALC: 6 UG/MG
AMB EXT TRIGLYCERIDES: 114 MG/DL

## 2023-05-03 RX ORDER — TIRZEPATIDE 10 MG/.5ML
10 INJECTION, SOLUTION SUBCUTANEOUS WEEKLY
Qty: 2 ML | Refills: 1 | Status: SHIPPED | OUTPATIENT
Start: 2023-05-03

## 2023-05-03 RX ORDER — TIRZEPATIDE 12.5 MG/.5ML
12.5 INJECTION, SOLUTION SUBCUTANEOUS WEEKLY
Qty: 2 ML | Refills: 0 | Status: CANCELLED
Start: 2023-05-03

## 2023-05-03 NOTE — TELEPHONE ENCOUNTER
From: Tamra Schlatter  To: EARLE Paris  Sent: 5/3/2023 11:46 AM CDT  Subject: Jason Abel    Can I get a refill for Mounjaro ?

## 2023-05-03 NOTE — TELEPHONE ENCOUNTER
Received labs ordered by  from [address] [place] [address]. Results entered manually. In 's bin for review, thanks!

## 2023-05-03 NOTE — TELEPHONE ENCOUNTER
Requesting Makayla  LOV: 1/11/23  RTC: 8 WEEKS   Last Relevant Labs:   Filled: 10MG ON 4/2/23 # with refills    Future Appointments   Date Time Provider David Yo   6/7/2023  3:40 PM Shoaib Kang APRN [facility]

## 2023-05-05 NOTE — TELEPHONE ENCOUNTER
Noted. Labs fine except mildly elevated bilirubin. If any abdominal pain or jaundice she needs evaluation. Otherwise recheck cbc, cmp, lipid, a1c, tsh with reflex in 6 months prior to cpx. Please order and inform pt.

## 2023-05-08 ENCOUNTER — MED REC SCAN ONLY (OUTPATIENT)
Dept: INTERNAL MEDICINE CLINIC | Facility: CLINIC | Age: 63
End: 2023-05-08

## 2023-05-08 RX ORDER — TIRZEPATIDE 10 MG/.5ML
10 INJECTION, SOLUTION SUBCUTANEOUS WEEKLY
Qty: 2 ML | Refills: 0 | OUTPATIENT
Start: 2023-05-08

## 2023-05-08 NOTE — TELEPHONE ENCOUNTER
Requesting mounjaro 10  LOV: 10/19/22  RTC: none on file  Filled: 5/3/23 #2ml with 1 refills    Future Appointments   Date Time Provider David Yo   6/7/2023  3:40 PM Tamika Pierson, EARLE EMGQUINNI [facility]     Denied.  Already filled

## 2023-05-08 NOTE — TELEPHONE ENCOUNTER
Patient notified. Patient verbalized understanding   Denies abdominal pain and jaundice. Labs placed.    Future Appointments   Date Time Provider David Yo   6/7/2023  3:40 PM Berny GreenrEARLE [facility]

## 2023-05-10 ENCOUNTER — TELEPHONE (OUTPATIENT)
Dept: INTERNAL MEDICINE CLINIC | Facility: CLINIC | Age: 63
End: 2023-05-10

## 2023-06-01 ENCOUNTER — MED REC SCAN ONLY (OUTPATIENT)
Dept: ORTHOPEDICS CLINIC | Facility: CLINIC | Age: 63
End: 2023-06-01

## 2023-06-07 ENCOUNTER — OFFICE VISIT (OUTPATIENT)
Dept: INTERNAL MEDICINE CLINIC | Facility: CLINIC | Age: 63
End: 2023-06-07
Payer: COMMERCIAL

## 2023-06-07 ENCOUNTER — TELEPHONE (OUTPATIENT)
Dept: ORTHOPEDICS CLINIC | Facility: CLINIC | Age: 63
End: 2023-06-07

## 2023-06-07 VITALS
WEIGHT: 192 LBS | SYSTOLIC BLOOD PRESSURE: 132 MMHG | HEART RATE: 88 BPM | BODY MASS INDEX: 36.25 KG/M2 | RESPIRATION RATE: 20 BRPM | HEIGHT: 61 IN | OXYGEN SATURATION: 98 % | DIASTOLIC BLOOD PRESSURE: 78 MMHG

## 2023-06-07 DIAGNOSIS — G47.33 OSA (OBSTRUCTIVE SLEEP APNEA): ICD-10-CM

## 2023-06-07 DIAGNOSIS — E78.00 PURE HYPERCHOLESTEROLEMIA: ICD-10-CM

## 2023-06-07 DIAGNOSIS — E11.9 TYPE 2 DIABETES MELLITUS WITHOUT COMPLICATION, WITHOUT LONG-TERM CURRENT USE OF INSULIN (HCC): ICD-10-CM

## 2023-06-07 DIAGNOSIS — E66.9 OBESITY (BMI 30-39.9): ICD-10-CM

## 2023-06-07 DIAGNOSIS — Z51.81 THERAPEUTIC DRUG MONITORING: Primary | ICD-10-CM

## 2023-06-07 PROCEDURE — 99214 OFFICE O/P EST MOD 30 MIN: CPT | Performed by: NURSE PRACTITIONER

## 2023-06-07 PROCEDURE — 3078F DIAST BP <80 MM HG: CPT | Performed by: NURSE PRACTITIONER

## 2023-06-07 PROCEDURE — 3075F SYST BP GE 130 - 139MM HG: CPT | Performed by: NURSE PRACTITIONER

## 2023-06-07 PROCEDURE — 3008F BODY MASS INDEX DOCD: CPT | Performed by: NURSE PRACTITIONER

## 2023-06-07 NOTE — PATIENT INSTRUCTIONS
Next steps:  1. Fill your prescribed medication and take as discussed and prescribed: mounjaro 10mg weekly   2. Schedule a personal nutrition consultation with one of our registered dieticians     Please try to work on the following dietary changes:  Daily protein recommendation to start:  grams  Daily carbohydrate: <100g  Daily calories: 1,200  1. Drink water with meals and throughout the day, cut down on soda and/or juice if consumed. Consider flavored water options like Bubbly, Spindrift, Hint and Sue. 2.  Eat breakfast daily and focus on having protein with each meal, examples include: greek yogurt, cottage cheese, hard boiled egg, whole grain toast with peanut butter. 3.  Reduce refined carbohydrates and sugars which includes items such as sweets, as well as rice, pasta, and bread and make sure to choose whole grain options when having them with just 1 serving per meal about the size of your inner palm. 4.  Consume non starchy veggies daily working towards making them a good 50% of your daily food intake. Add them to lunch and dinner consistently. 5.  Start a daily probiotic: VSL#3 is recommended, (order on line at www.vsl3. com). Take 1 capsule daily with water for 30 days, then reduce to 1 every other day (this will reduce the cost). Capsules can be left out for 2 weeks, but then must be refrigerated. Please download rica My Fitness Yuniel Gasmen! Or Net Diary to monitor daily dietary intake and you will be able to see if you are eating the right amount of calories or too much or too little which would hinder weight loss. Additionally this will help to see your daily carbohydrate and protein intake. When you set the rica up choose 1-2 lbs/week as a goal.  Keeping a paper food journal is an option as well to remain accountable for your choices- this is the start to mindful eating! A low calorie diet has been consistently shown to support weight loss.      Continue or start exercising to help establish a routine. If not already exercising begin with 1 day and progress as able with long-term goal of 30 minutes 5 days a week at a minimum. Meditation daily can help manage and control stress. Chronic stress can make weight loss difficult. Exercising is one way to help with stress, but meditation using the CALM Triston or another comparable alternative can be done in your home or place of work with little time commitment. This Triston can also help work on behavior change and improve sleep. Check out the segment under Calm Masterclass and listen to The 4 Pillars of Health. A great way to begin learning about the foundation of lifestyle with practical tips to use in your every day. Check out www.yourweightmatters. org blog for continued daily support and education along this weight loss journey! Patient Resources:     Personal Training/Fitness Classes/Health Coaching     Keisha Chou and Sandoval Sophiaside @ [email or web address].mitchell-reyes.biz/ Full fitness center with group fitness and personal training. Discount available as client of [facility]. Health Coaching and Personal Training with Ken Roy at our [facility]- individual weekly coaching with option to add personal training and small group fitness classes targeted at weight loss- [phone] and/or email @ Gelacio [email or web address] [employer] Learning. org  360FIT [place] [email or web address]-[email or web address] Group Fitness [phone] and/or email Chuck Colvin at [email or web address]  [address] [facility] with multiple locations: Aetna (www.Ksplice), Eat The Frog Fitness (www.Instamour. Little [place] Windmill), Fit Body Bootcamp (www.Wisherybodybootcamp.com), adicate timeads Fitness (www.Guiltlessbeauty.com. [place]), The Exercise  (www.exercisecoach.[place] Windmill)     Online Fitness  Fitness  on [employer] in 10 DVD series- www. vwrdl76TWP. [place]mill  Sit and Be Fit - Chair exercise series Www.sitandbefit. org  Hip Hop Fit with Kwame Anglin at Axiata     Apps for on the Cellity 7 Minute Workout (orange box with white 7) - free on the go HIIT training triston  Peloton Triston @ www. BrightArch     Nutrition Trackers and Tools  LoseIT! And My Fitness Pal apps and on line for tracking nutrition  NOOM - virtual health coaching  FitFoundation (healthy meals on the go) in [facility] @ www. gupsenbooadgf3l. Aisha Morse MD @ www.Redstone ResourcesdBioservo Technologies and Jorge Alberto Gotti (keto and low carb plans recommended) @ www. KSIPQI27.NTT, Metabolic Meals @ www. Triton Algae InnovationsMetabolicMeals. com - individual prepared meals to go  Abattis Bioceuticals, EVS Glaucoma Therapeutics, International Business Machines, Every Plate, Zooplus- on line meal delivery programs for preparation at home  AK Pulse.io in [place] for homemade meals to go @ www.Everfi  Diet Doctor @ www. dietdoctor. CHARLES & COLVARD LTD - low carb swaps  Yummly - meal prep and planning triston (www.yummly. com)     Stress Management/Behavior/Mindful Eating  CALM meditation triston (www.tagUin)  Headspace  Am I Hungry? Mindful eating virtual  triston  Www.yourweightmatters. org - Obesity Action Coalition sponsored Blog posts daily  Motivation triston (black box with white \")- daily supportive messages sent to your phone     Books/Video Education/Podcasts  Mindless Eating by Jocelyne Rosales  Why We Get Sick by Elizabeth Briseno (a book about insulin resistance)  Atomic Habits by Venessa Mayer (a book about taking small steps to promote greater behavior change)   Can't Hurt Me by Dorita Hercules (a book exploring the power of discipline in achieving your goals)  The End of Dieting: How to Live for Life by Dr. Luis Felipe Finnegan M.D. or listen to The [address] Episode 61: Understanding \"Nutritarian\" Eating w/Dr. Luis Felipe Finnegan  Your Body in Balance: The World [employer] Corporation of Food, Hormones, and Health by Dr. Danette Pillai  The Menopause Diet Plan by Antoine Dyson and [facility] AT [facility]  The Complete Guide to fasting by Dr. Von Mchugh, [address] [facility] by Bryant Merino, Ph.D, R.D.   Weight Loss Surgery Will Not Treat Food Addiction by Tri Palacios Ph.D  The [address] [place] [address] on plant based nutrition  Fed Up - documentary about obesity (Free on [place])  The Truth About Sugar - documentary on sugar (Free on Utube, https://youtu. be/1V6dkjbUS2i)  The Dr. Steven Petersen by Dr. Jairon Morrison MD  Fitlosophy Fitspiration - journal to better health (found at [employer] in fitness aisle)  What Happened to You?- a look at the impact trauma has on behavior written by Bia Sandoval and Dr. Donte Romero Again by Frida Leon - discovering your true self after trauma  Elba Gray talk on ExtremeScapes of [place], The Call to Courage  Podcasts: The Exam Room by the Physician's Committee, Nutrition Facts by Dr. Ania Monk    We are here to support you with weight loss, but please remember that you still need your primary care provider for your routine health maintenance.

## 2023-06-07 NOTE — TELEPHONE ENCOUNTER
Spoke to patient to see which ankle she is being seen for. Patient states it is the right ankle and she will be bringing disc with most recent imaging from 2 weeks ago.

## 2023-06-08 ENCOUNTER — TELEPHONE (OUTPATIENT)
Dept: ORTHOPEDICS CLINIC | Facility: CLINIC | Age: 63
End: 2023-06-08

## 2023-06-08 ENCOUNTER — OFFICE VISIT (OUTPATIENT)
Dept: ORTHOPEDICS CLINIC | Facility: CLINIC | Age: 63
End: 2023-06-08
Payer: COMMERCIAL

## 2023-06-08 VITALS — HEIGHT: 61 IN | WEIGHT: 192 LBS | BODY MASS INDEX: 36.25 KG/M2

## 2023-06-08 DIAGNOSIS — E11.9 TYPE 2 DIABETES MELLITUS WITHOUT COMPLICATION, WITHOUT LONG-TERM CURRENT USE OF INSULIN (HCC): ICD-10-CM

## 2023-06-08 DIAGNOSIS — Z96.9 PRESENCE OF RETAINED HARDWARE: ICD-10-CM

## 2023-06-08 DIAGNOSIS — S82.851S CLOSED TRIMALLEOLAR FRACTURE OF RIGHT ANKLE, SEQUELA: Primary | ICD-10-CM

## 2023-06-08 PROBLEM — S82.851A CLOSED TRIMALLEOLAR FRACTURE OF RIGHT ANKLE: Status: ACTIVE | Noted: 2023-06-08

## 2023-06-08 PROBLEM — E66.01 MORBID (SEVERE) OBESITY DUE TO EXCESS CALORIES (HCC): Status: ACTIVE | Noted: 2023-06-08

## 2023-06-08 PROCEDURE — 3008F BODY MASS INDEX DOCD: CPT | Performed by: PODIATRIST

## 2023-06-08 PROCEDURE — 99203 OFFICE O/P NEW LOW 30 MIN: CPT | Performed by: PODIATRIST

## 2023-06-08 NOTE — TELEPHONE ENCOUNTER
Spoke to patient to inform her that her disc is ready for mailing or piick up she asked me to hold on to it until her follow up visit. Let patient know she should arrive at follow up visit about 15 minutes early in order to complete imaging.

## 2023-07-01 ENCOUNTER — MED REC SCAN ONLY (OUTPATIENT)
Dept: ORTHOPEDICS CLINIC | Facility: CLINIC | Age: 63
End: 2023-07-01

## 2023-07-20 ENCOUNTER — OFFICE VISIT (OUTPATIENT)
Dept: ORTHOPEDICS CLINIC | Facility: CLINIC | Age: 63
End: 2023-07-20
Payer: COMMERCIAL

## 2023-07-20 ENCOUNTER — HOSPITAL ENCOUNTER (OUTPATIENT)
Dept: GENERAL RADIOLOGY | Age: 63
Discharge: HOME | End: 2023-07-20
Attending: PODIATRIST
Payer: COMMERCIAL

## 2023-07-20 DIAGNOSIS — Z96.9 PRESENCE OF RETAINED HARDWARE: ICD-10-CM

## 2023-07-20 DIAGNOSIS — S82.851S CLOSED TRIMALLEOLAR FRACTURE OF RIGHT ANKLE, SEQUELA: ICD-10-CM

## 2023-07-20 DIAGNOSIS — E11.9 TYPE 2 DIABETES MELLITUS WITHOUT COMPLICATION, WITHOUT LONG-TERM CURRENT USE OF INSULIN (HCC): ICD-10-CM

## 2023-07-20 DIAGNOSIS — S82.851S CLOSED TRIMALLEOLAR FRACTURE OF RIGHT ANKLE, SEQUELA: Primary | ICD-10-CM

## 2023-07-20 PROCEDURE — 73610 X-RAY EXAM OF ANKLE: CPT | Performed by: PODIATRIST

## 2023-07-20 PROCEDURE — 99213 OFFICE O/P EST LOW 20 MIN: CPT | Performed by: PODIATRIST

## 2023-07-20 NOTE — PROGRESS NOTES
[facility] Progress Note    Subjective:   Pt here for follow up ankle fracture, now close to 4 months postop. She is using a cane for balance    Goes to PT and would like to continue      Objective:     Good ROM right ankle joint  Mild residual swelling          Imaging: xrays right ankle -hardware is in place and the fractures have healed. Disuse osteopenia is noted        Assessment/Plan:     Diagnoses and all orders for this visit:    Closed trimalleolar fracture of right ankle, sequela    Type 2 diabetes mellitus without complication, without long-term current use of insulin (HCC)    Presence of retained hardware      Continue therapy  Use of brace for high risk activities over next 3-4 months  Follow-up at this point is as needed      She is hopeful to find her original disc of the x-ray from out of town and we will attempt to find that for her. She would like it mailed to her home. Radha Rashid. Sonny Arrington DPM  [facility]    MD [employer] speech recognition software was used to prepare this note. If a word or phrase is confusing, it is likely do to a failure of recognition. Please contact me with any questions or clarifications.

## 2023-08-01 ENCOUNTER — MED REC SCAN ONLY (OUTPATIENT)
Dept: ORTHOPEDICS CLINIC | Facility: CLINIC | Age: 63
End: 2023-08-01

## 2023-08-03 ENCOUNTER — PATIENT MESSAGE (OUTPATIENT)
Dept: INTERNAL MEDICINE CLINIC | Facility: CLINIC | Age: 63
End: 2023-08-03

## 2023-08-03 NOTE — TELEPHONE ENCOUNTER
From: Janice Luis  To: Wilmer Stephenson MD  Sent: 8/3/2023 1:20 PM CDT  Subject: Diabetic foot exam    I have scheduled my annual physical on October 23, 2023 at 10:40. Can my diabetic foot exam be included in that visit or do I need to schedule a separate appointment?

## 2023-08-03 NOTE — TELEPHONE ENCOUNTER
Do we do this in the annual visit?      Future Appointments   Date Time Provider David Yo   9/19/2023 10:00 AM [facility]   10/10/2023 11:00 AM EARLE TranWEI [facility] [address]   10/23/2023 10:40 AM Jarrett Garcia MD [facility] [address] [facility] [address]

## 2023-08-21 ENCOUNTER — OFFICE VISIT (OUTPATIENT)
Dept: INTERNAL MEDICINE CLINIC | Facility: CLINIC | Age: 63
End: 2023-08-21
Payer: COMMERCIAL

## 2023-08-21 VITALS
SYSTOLIC BLOOD PRESSURE: 124 MMHG | TEMPERATURE: 98 F | BODY MASS INDEX: 36.75 KG/M2 | OXYGEN SATURATION: 97 % | DIASTOLIC BLOOD PRESSURE: 80 MMHG | HEART RATE: 80 BPM | HEIGHT: 61 IN | WEIGHT: 194.63 LBS | RESPIRATION RATE: 14 BRPM

## 2023-08-21 DIAGNOSIS — J02.9 SORE THROAT: ICD-10-CM

## 2023-08-21 DIAGNOSIS — J06.9 VIRAL URI WITH COUGH: Primary | ICD-10-CM

## 2023-08-21 LAB
CONTROL LINE PRESENT WITH A CLEAR BACKGROUND (YES/NO): YES YES/NO
KIT LOT #: NORMAL NUMERIC
STREP GRP A CUL-SCR: NEGATIVE

## 2023-08-21 PROCEDURE — 87081 CULTURE SCREEN ONLY: CPT | Performed by: NURSE PRACTITIONER

## 2023-08-21 RX ORDER — VITAMIN E (DL,TOCOPHERYL ACET) 180 MG
1 CAPSULE ORAL DAILY
COMMUNITY

## 2023-08-21 RX ORDER — BENZONATATE 200 MG/1
200 CAPSULE ORAL 3 TIMES DAILY PRN
Qty: 30 CAPSULE | Refills: 0 | Status: SHIPPED | OUTPATIENT
Start: 2023-08-21

## 2023-08-21 RX ORDER — METHYLPREDNISOLONE 4 MG/1
TABLET ORAL
Qty: 1 EACH | Refills: 0 | Status: SHIPPED | OUTPATIENT
Start: 2023-08-21

## 2023-08-21 NOTE — PATIENT INSTRUCTIONS
Start benzonatate perles as needed for the cough. Do warm, salt water gargles 2-3 times daily. You can use Robitussin DM or Mucinex DM as directed on the bottle for cough and chest congestion as well. Use Cepacol for sore throat and dry cough as needed. Use Tylenol as needed for pain or fever. Stay hydrated. If symptoms worsen or do not improve in the next 2-3 days, start the steroid pack. Do not lay down for 1 hour after taking it. Monitor for side effects including stomach pain, acid reflux, trouble sleeping, and anxiety. Go to ER or call 911 if worsening symptoms such as persistent fever >103, difficulty breathing, or chest pain.     Infection prevention:  - Proper hand hygiene is very important          - Wear a mask in public  - Avoid touching your mouth, nose, eyes, and face  - Practice social distancing and staying 6 ft away from other individuals  - Cough into your arm or a tissue  - Avoid contact with others if you have symptoms of an upper or lower respiratory infection  - Avoid contact with others who are ill  - Avoid direct contact with your pets if you are ill  - Disinfect surfaces with appropriate materials  - If your symptoms become severe (shortness of breath with rest or activity, fever, chest congestion, productive cough), go to the ER  - Recommend self-isolation at home if you are sick, wearing a mask if in room with other family members

## 2023-09-19 ENCOUNTER — HOSPITAL ENCOUNTER (OUTPATIENT)
Dept: MAMMOGRAPHY | Age: 63
Discharge: HOME | End: 2023-09-19
Attending: INTERNAL MEDICINE
Payer: COMMERCIAL

## 2023-09-19 DIAGNOSIS — Z12.31 ENCOUNTER FOR SCREENING MAMMOGRAM FOR MALIGNANT NEOPLASM OF BREAST: ICD-10-CM

## 2023-09-19 PROCEDURE — 77067 SCR MAMMO BI INCL CAD: CPT | Performed by: INTERNAL MEDICINE

## 2023-09-19 PROCEDURE — 77063 BREAST TOMOSYNTHESIS BI: CPT | Performed by: INTERNAL MEDICINE

## 2023-09-22 ENCOUNTER — TELEPHONE (OUTPATIENT)
Dept: INTERNAL MEDICINE CLINIC | Facility: CLINIC | Age: 63
End: 2023-09-22

## 2023-10-10 ENCOUNTER — MED REC SCAN ONLY (OUTPATIENT)
Dept: ORTHOPEDICS CLINIC | Facility: CLINIC | Age: 63
End: 2023-10-10

## 2023-10-14 LAB
ABSOLUTE BASOPHILS: 48 CELLS/UL (ref 0–200)
ABSOLUTE EOSINOPHILS: 221 CELLS/UL (ref 15–500)
ABSOLUTE LYMPHOCYTES: 2229 CELLS/UL (ref 850–3900)
ABSOLUTE MONOCYTES: 469 CELLS/UL (ref 200–950)
ABSOLUTE NEUTROPHILS: 3933 CELLS/UL (ref 1500–7800)
ALBUMIN/GLOBULIN RATIO: 1.4 (CALC) (ref 1–2.5)
ALBUMIN: 4.2 G/DL (ref 3.6–5.1)
ALKALINE PHOSPHATASE: 108 U/L (ref 37–153)
ALT: 24 U/L (ref 6–29)
AST: 16 U/L (ref 10–35)
BASOPHILS: 0.7 %
BILIRUBIN, TOTAL: 1.3 MG/DL (ref 0.2–1.2)
BUN: 16 MG/DL (ref 7–25)
CALCIUM: 9.8 MG/DL (ref 8.6–10.4)
CARBON DIOXIDE: 23 MMOL/L (ref 20–32)
CHLORIDE: 108 MMOL/L (ref 98–110)
CHOL/HDLC RATIO: 2.6 (CALC)
CHOLESTEROL, TOTAL: 187 MG/DL
CREATININE, RANDOM URINE: 100 MG/DL (ref 20–275)
CREATININE: 0.59 MG/DL (ref 0.5–1.05)
EGFR: 102 ML/MIN/1.73M2
EOSINOPHILS: 3.2 %
GLOBULIN: 3.1 G/DL (CALC) (ref 1.9–3.7)
GLUCOSE: 96 MG/DL (ref 65–99)
HDL CHOLESTEROL: 73 MG/DL
HEMATOCRIT: 42.2 % (ref 35–45)
HEMOGLOBIN A1C: 5.6 % OF TOTAL HGB
HEMOGLOBIN: 14.1 G/DL (ref 11.7–15.5)
LDL-CHOLESTEROL: 91 MG/DL (CALC)
LYMPHOCYTES: 32.3 %
MCH: 30.4 PG (ref 27–33)
MCHC: 33.4 G/DL (ref 32–36)
MCV: 90.9 FL (ref 80–100)
MICROALBUMIN/CREATININE RATIO, RANDOM URINE: 4 MCG/MG CREAT
MICROALBUMIN: 0.4 MG/DL
MONOCYTES: 6.8 %
MPV: 9.1 FL (ref 7.5–12.5)
NEUTROPHILS: 57 %
NON-HDL CHOLESTEROL: 114 MG/DL (CALC)
PLATELET COUNT: 318 THOUSAND/UL (ref 140–400)
POTASSIUM: 4.4 MMOL/L (ref 3.5–5.3)
PROTEIN, TOTAL: 7.3 G/DL (ref 6.1–8.1)
RDW: 12.4 % (ref 11–15)
RED BLOOD CELL COUNT: 4.64 MILLION/UL (ref 3.8–5.1)
SODIUM: 147 MMOL/L (ref 135–146)
TRIGLYCERIDES: 133 MG/DL
TSH W/REFLEX TO FT4: 1.7 MIU/L (ref 0.4–4.5)
WHITE BLOOD CELL COUNT: 6.9 THOUSAND/UL (ref 3.8–10.8)

## 2023-10-21 ENCOUNTER — LAB ENCOUNTER (OUTPATIENT)
Dept: LAB | Age: 63
End: 2023-10-21
Attending: INTERNAL MEDICINE
Payer: COMMERCIAL

## 2023-10-21 LAB
ALBUMIN SERPL-MCNC: 3.6 G/DL (ref 3.4–5)
ALBUMIN/GLOB SERPL: 0.9 {RATIO} (ref 1–2)
ALP LIVER SERPL-CCNC: 111 U/L
ALT SERPL-CCNC: 37 U/L
ANION GAP SERPL CALC-SCNC: 3 MMOL/L (ref 0–18)
AST SERPL-CCNC: 8 U/L (ref 15–37)
BILIRUB SERPL-MCNC: 1.2 MG/DL (ref 0.1–2)
BUN BLD-MCNC: 14 MG/DL (ref 7–18)
CALCIUM BLD-MCNC: 9.4 MG/DL (ref 8.5–10.1)
CHLORIDE SERPL-SCNC: 108 MMOL/L (ref 98–112)
CHOLEST SERPL-MCNC: 156 MG/DL (ref ?–200)
CO2 SERPL-SCNC: 29 MMOL/L (ref 21–32)
CREAT BLD-MCNC: 0.6 MG/DL
EGFRCR SERPLBLD CKD-EPI 2021: 101 ML/MIN/1.73M2 (ref 60–?)
EST. AVERAGE GLUCOSE BLD GHB EST-MCNC: 126 MG/DL (ref 68–126)
FASTING PATIENT LIPID ANSWER: YES
FASTING STATUS PATIENT QL REPORTED: YES
GLOBULIN PLAS-MCNC: 3.8 G/DL (ref 2.8–4.4)
GLUCOSE BLD-MCNC: 95 MG/DL (ref 70–99)
HBA1C MFR BLD: 6 % (ref ?–5.7)
HDLC SERPL-MCNC: 77 MG/DL (ref 40–59)
LDLC SERPL CALC-MCNC: 62 MG/DL (ref ?–100)
NONHDLC SERPL-MCNC: 79 MG/DL (ref ?–130)
OSMOLALITY SERPL CALC.SUM OF ELEC: 290 MOSM/KG (ref 275–295)
POTASSIUM SERPL-SCNC: 4.1 MMOL/L (ref 3.5–5.1)
PROT SERPL-MCNC: 7.4 G/DL (ref 6.4–8.2)
SODIUM SERPL-SCNC: 140 MMOL/L (ref 136–145)
TRIGL SERPL-MCNC: 92 MG/DL (ref 30–149)
VLDLC SERPL CALC-MCNC: 14 MG/DL (ref 0–30)

## 2023-10-21 PROCEDURE — 82728 ASSAY OF FERRITIN: CPT | Performed by: INTERNAL MEDICINE

## 2023-10-21 PROCEDURE — 80053 COMPREHEN METABOLIC PANEL: CPT | Performed by: INTERNAL MEDICINE

## 2023-10-21 PROCEDURE — 83036 HEMOGLOBIN GLYCOSYLATED A1C: CPT | Performed by: INTERNAL MEDICINE

## 2023-10-21 PROCEDURE — 36415 COLL VENOUS BLD VENIPUNCTURE: CPT | Performed by: INTERNAL MEDICINE

## 2023-10-21 PROCEDURE — 80061 LIPID PANEL: CPT | Performed by: INTERNAL MEDICINE

## 2023-10-23 ENCOUNTER — OFFICE VISIT (OUTPATIENT)
Dept: INTERNAL MEDICINE CLINIC | Facility: CLINIC | Age: 63
End: 2023-10-23
Payer: COMMERCIAL

## 2023-10-23 VITALS
WEIGHT: 204.31 LBS | SYSTOLIC BLOOD PRESSURE: 138 MMHG | DIASTOLIC BLOOD PRESSURE: 84 MMHG | HEART RATE: 76 BPM | HEIGHT: 61 IN | TEMPERATURE: 98 F | RESPIRATION RATE: 12 BRPM | BODY MASS INDEX: 38.57 KG/M2

## 2023-10-23 DIAGNOSIS — Z78.0 POSTMENOPAUSAL: ICD-10-CM

## 2023-10-23 DIAGNOSIS — E78.00 PURE HYPERCHOLESTEROLEMIA: ICD-10-CM

## 2023-10-23 DIAGNOSIS — E11.9 TYPE 2 DIABETES MELLITUS WITHOUT COMPLICATION, WITHOUT LONG-TERM CURRENT USE OF INSULIN (HCC): ICD-10-CM

## 2023-10-23 DIAGNOSIS — Z23 NEED FOR VACCINATION: ICD-10-CM

## 2023-10-23 DIAGNOSIS — L65.9 HAIR LOSS: ICD-10-CM

## 2023-10-23 DIAGNOSIS — G47.33 OSA (OBSTRUCTIVE SLEEP APNEA): ICD-10-CM

## 2023-10-23 DIAGNOSIS — Z00.00 PHYSICAL EXAM, ANNUAL: ICD-10-CM

## 2023-10-23 LAB — DEPRECATED HBV CORE AB SER IA-ACNC: 155.6 NG/ML

## 2023-10-31 ENCOUNTER — HOSPITAL ENCOUNTER (OUTPATIENT)
Dept: BONE DENSITY | Age: 63
Discharge: HOME | End: 2023-10-31
Attending: INTERNAL MEDICINE
Payer: COMMERCIAL

## 2023-10-31 DIAGNOSIS — Z78.0 POSTMENOPAUSAL: ICD-10-CM

## 2023-10-31 PROCEDURE — 77080 DXA BONE DENSITY AXIAL: CPT | Performed by: INTERNAL MEDICINE

## 2023-11-28 ENCOUNTER — PATIENT MESSAGE (OUTPATIENT)
Dept: INTERNAL MEDICINE CLINIC | Facility: CLINIC | Age: 63
End: 2023-11-28

## 2023-11-28 NOTE — TELEPHONE ENCOUNTER
From: Dionisio Hernandez  To: Fariba Payor  Sent: 11/28/2023 3:31 PM CST  Subject: [address] [place] [address] Dermatology- bloodwork     I have been to [address] [place] [address] Dermatology to discuss my hair loss. Can you send them my recent blood work from October? They have specifically requested-Ferritin, Vitamin D, TSH, zinc, and KADE. If there any problems with this request please let me know. Thank you.

## 2023-11-28 NOTE — TELEPHONE ENCOUNTER
Labs faxed to [facility] office fax confirmed. Pt notified that zinc and KADE were not drawn.

## 2024-01-24 DIAGNOSIS — E78.00 PURE HYPERCHOLESTEROLEMIA: ICD-10-CM

## 2024-01-25 RX ORDER — ATORVASTATIN CALCIUM 20 MG/1
TABLET, FILM COATED ORAL
Qty: 90 TABLET | Refills: 1 | Status: SHIPPED | OUTPATIENT
Start: 2024-01-25

## 2024-04-17 DIAGNOSIS — Z86.19 HISTORY OF COLD SORES: ICD-10-CM

## 2024-04-17 RX ORDER — VALACYCLOVIR HYDROCHLORIDE 1 G/1
1000 TABLET, FILM COATED ORAL EVERY 12 HOURS
Qty: 2 TABLET | Refills: 1 | Status: SHIPPED | OUTPATIENT
Start: 2024-04-17

## 2024-04-23 ENCOUNTER — LAB ENCOUNTER (OUTPATIENT)
Dept: LAB | Age: 64
End: 2024-04-23
Attending: INTERNAL MEDICINE
Payer: COMMERCIAL

## 2024-04-23 DIAGNOSIS — E11.9 TYPE 2 DIABETES MELLITUS WITHOUT COMPLICATION, WITHOUT LONG-TERM CURRENT USE OF INSULIN (HCC): ICD-10-CM

## 2024-04-23 DIAGNOSIS — E78.00 PURE HYPERCHOLESTEROLEMIA: ICD-10-CM

## 2024-04-23 LAB
ALBUMIN SERPL-MCNC: 3.6 G/DL (ref 3.4–5)
ALBUMIN/GLOB SERPL: 0.9 {RATIO} (ref 1–2)
ALP LIVER SERPL-CCNC: 122 U/L
ALT SERPL-CCNC: 29 U/L
ANION GAP SERPL CALC-SCNC: 6 MMOL/L (ref 0–18)
AST SERPL-CCNC: 18 U/L (ref 15–37)
BILIRUB SERPL-MCNC: 1.2 MG/DL (ref 0.1–2)
BUN BLD-MCNC: 14 MG/DL (ref 9–23)
CALCIUM BLD-MCNC: 9.6 MG/DL (ref 8.5–10.1)
CHLORIDE SERPL-SCNC: 107 MMOL/L (ref 98–112)
CHOLEST SERPL-MCNC: 179 MG/DL (ref ?–200)
CO2 SERPL-SCNC: 27 MMOL/L (ref 21–32)
CREAT BLD-MCNC: 0.79 MG/DL
EGFRCR SERPLBLD CKD-EPI 2021: 84 ML/MIN/1.73M2 (ref 60–?)
EST. AVERAGE GLUCOSE BLD GHB EST-MCNC: 140 MG/DL (ref 68–126)
FASTING PATIENT LIPID ANSWER: YES
FASTING STATUS PATIENT QL REPORTED: YES
GLOBULIN PLAS-MCNC: 3.8 G/DL (ref 2.8–4.4)
GLUCOSE BLD-MCNC: 110 MG/DL (ref 70–99)
HBA1C MFR BLD: 6.5 % (ref ?–5.7)
HDLC SERPL-MCNC: 57 MG/DL (ref 40–59)
LDLC SERPL CALC-MCNC: 98 MG/DL (ref ?–100)
NONHDLC SERPL-MCNC: 122 MG/DL (ref ?–130)
OSMOLALITY SERPL CALC.SUM OF ELEC: 291 MOSM/KG (ref 275–295)
POTASSIUM SERPL-SCNC: 3.8 MMOL/L (ref 3.5–5.1)
PROT SERPL-MCNC: 7.4 G/DL (ref 6.4–8.2)
SODIUM SERPL-SCNC: 140 MMOL/L (ref 136–145)
TRIGL SERPL-MCNC: 138 MG/DL (ref 30–149)
VLDLC SERPL CALC-MCNC: 23 MG/DL (ref 0–30)

## 2024-04-23 PROCEDURE — 80053 COMPREHEN METABOLIC PANEL: CPT

## 2024-04-23 PROCEDURE — 80061 LIPID PANEL: CPT

## 2024-04-23 PROCEDURE — 83036 HEMOGLOBIN GLYCOSYLATED A1C: CPT

## 2024-04-23 PROCEDURE — 36415 COLL VENOUS BLD VENIPUNCTURE: CPT

## 2024-04-24 ENCOUNTER — TELEPHONE (OUTPATIENT)
Dept: INTERNAL MEDICINE CLINIC | Facility: CLINIC | Age: 64
End: 2024-04-24

## 2024-04-24 NOTE — TELEPHONE ENCOUNTER
Date of pre-op appt:  4/30/24  Provider pre-op scheduled with: Negar  Surgeon's name:  Tray Rodriguez   Phone #:  [phone]   Fax #:  [phone]  Surgery date:  5/7/24 & 5/21/24  Procedure/diagnosis:  Cataract extraction(?)    Pre op form given to: Laura

## 2024-04-26 ENCOUNTER — OFFICE VISIT (OUTPATIENT)
Dept: INTERNAL MEDICINE CLINIC | Facility: CLINIC | Age: 64
End: 2024-04-26
Payer: COMMERCIAL

## 2024-04-26 VITALS
RESPIRATION RATE: 16 BRPM | TEMPERATURE: 97 F | SYSTOLIC BLOOD PRESSURE: 134 MMHG | BODY MASS INDEX: 39.63 KG/M2 | HEIGHT: 62 IN | HEART RATE: 88 BPM | DIASTOLIC BLOOD PRESSURE: 86 MMHG | WEIGHT: 215.38 LBS

## 2024-04-26 DIAGNOSIS — Z00.00 PHYSICAL EXAM, ANNUAL: ICD-10-CM

## 2024-04-26 DIAGNOSIS — Z86.19 HISTORY OF COLD SORES: ICD-10-CM

## 2024-04-26 DIAGNOSIS — H61.21 IMPACTED CERUMEN OF RIGHT EAR: ICD-10-CM

## 2024-04-26 DIAGNOSIS — E11.9 TYPE 2 DIABETES MELLITUS WITHOUT COMPLICATION, WITHOUT LONG-TERM CURRENT USE OF INSULIN (HCC): Primary | ICD-10-CM

## 2024-04-26 DIAGNOSIS — R03.0 ELEVATED BLOOD PRESSURE READING: ICD-10-CM

## 2024-04-26 DIAGNOSIS — E78.00 PURE HYPERCHOLESTEROLEMIA: ICD-10-CM

## 2024-04-26 DIAGNOSIS — J01.00 ACUTE MAXILLARY SINUSITIS, RECURRENCE NOT SPECIFIED: ICD-10-CM

## 2024-04-26 DIAGNOSIS — G47.33 OSA (OBSTRUCTIVE SLEEP APNEA): ICD-10-CM

## 2024-04-26 RX ORDER — AMOXICILLIN 875 MG/1
875 TABLET, COATED ORAL 2 TIMES DAILY
Qty: 14 TABLET | Refills: 0 | Status: SHIPPED | OUTPATIENT
Start: 2024-04-26

## 2024-04-26 RX ORDER — METFORMIN HYDROCHLORIDE 500 MG/1
500 TABLET, EXTENDED RELEASE ORAL 2 TIMES DAILY WITH MEALS
Qty: 180 TABLET | Refills: 0 | Status: SHIPPED | OUTPATIENT
Start: 2024-04-26

## 2024-04-26 RX ORDER — PREDNISOLN SP/MOXIFLOX/BROMFEN 1 %-0.5 %
DROPS OPHTHALMIC (EYE)
COMMUNITY
Start: 2024-04-17

## 2024-04-26 NOTE — PROGRESS NOTES
[facility]    CHIEF COMPLAINT:    Chief Complaint   Patient presents with    Test Results     10/30/20-pap. 9/19/23-mammo. 9/9/19-colon-repeat 10 years    Other     Had cold-like sx 10 days ago. Has sinus karmen in the morning. Occasional productive cough with chest karmen. Doesn't feel like herself. Neg covid test 10 days ago.          HISTORY OF PRESENT ILLNESS:  here for med check.   Dm: currently diet controlled. A1c 6.5. she was on mounjaro before but too expensive, insurance did not cover.      Hyperlipidemia: on statin. No muscle aches.  LDL at goal.      Joselito: was mild. She was told to lose weight. She is working on this. No cpap.      history of cold sores. Gets it when she gets a cold. Use valtrex prn episodically.     She had a uri 10 days ago. Felt symptoms were mild. Had some congestion, mild cough. Still feels like symptoms are present. Sinuses feel a bit congested. Cough has improved but not gone. Mucus is sporadic and yellow.   She is getting cataract surgery on may 7.   Right ear felt some pressure.     REVIEW OF SYSTEMS:  See HPI    Current Medications:    Current Outpatient Medications   Medication Sig Dispense Refill    NON FORMULARY Viviscal tablets. Take one daily      metFORMIN  MG Oral Tablet 24 Hr Take 1 tablet (500 mg total) by mouth 2 (two) times daily with meals. 180 tablet 0    amoxicillin 875 MG Oral Tab Take 1 tablet (875 mg total) by mouth 2 (two) times daily. 14 tablet 0    VALACYCLOVIR 1 G Oral Tab TAKE 1 TABLET BY MOUTH EVERY 12 HOURS 2 tablet 1    atorvastatin 20 MG Oral Tab TAKE 1 TABLET(20 MG) BY MOUTH DAILY 90 tablet 1    Accu-Chek FastClix Lancets Does not apply Misc Check twice daily 204 each 3    Lancets Misc. (ACCU-CHEK FASTCLIX LANCET) Does not apply Kit Use twice daily with fastclix lancets 1 kit 1    Blood Glucose Monitoring Suppl (ACCU-CHEK GUIDE) w/Device Does not apply Kit Use as directed daily. 1 kit 0    Cholecalciferol (VITAMIN D3) 75 MCG (3000 UT) Oral  Tab Take 1 tablet by mouth daily.      Prednisolon-Moxiflox-Bromfenac 1-0.5-0.075 % Ophthalmic Solution as directed Ophthalmic for 30 days (Patient not taking: Reported on 4/26/2024)         PAST MEDICAL, SOCIAL, AND FAMILY HISTORY:  Tobacco:    History   Smoking Status    Never   Smokeless Tobacco    Never       PHYSICAL EXAM:  /86   Pulse 88   Temp 97.4 °F (36.3 °C) (Temporal)   Resp 16   Ht 5' 2\" (1.575 m)   Wt 215 lb 6.4 oz (97.7 kg)   Breastfeeding No   BMI 39.40 kg/m²    GENERAL: well developed, well nourished,in no apparent distress  HEENT: Oropharynx normal. No tonsillar enlargement or exudates. Right tm not visualized, has cerumen impaction. Left tm normal. Sinuses with mild tenderness to percussion over the maxillary sinuses.   NECK: no lad  LUNGS: clear to auscultation  CARDIO: RRR without murmur  GI: good BS's,no masses, HSM or tenderness  MUSCULOSKELETAL:  no edema, muscle strength normal.   Bilateral barefoot skin diabetic exam is normal, visualized feet and the appearance is normal.  Bilateral monofilament/sensation of both feet is normal.  Pulsation pedal pulse exam of both lower legs/feet is normal as well.       DATA:  Results for orders placed or performed in visit on 04/23/24   CMP in 6 months   Result Value Ref Range    Glucose 110 (H) 70 - 99 mg/dL    Sodium 140 136 - 145 mmol/L    Potassium 3.8 3.5 - 5.1 mmol/L    Chloride 107 98 - 112 mmol/L    CO2 27.0 21.0 - 32.0 mmol/L    Anion Gap 6 0 - 18 mmol/L    BUN 14 9 - 23 mg/dL    Creatinine 0.79 0.55 - 1.02 mg/dL    Calcium, Total 9.6 8.5 - 10.1 mg/dL    Calculated Osmolality 291 275 - 295 mOsm/kg    eGFR-Cr 84 >=60 mL/min/1.73m2    AST 18 15 - 37 U/L    ALT 29 13 - 56 U/L    Alkaline Phosphatase 122 50 - 130 U/L    Bilirubin, Total 1.2 0.1 - 2.0 mg/dL    Total Protein 7.4 6.4 - 8.2 g/dL    Albumin 3.6 3.4 - 5.0 g/dL    Globulin  3.8 2.8 - 4.4 g/dL    A/G Ratio 0.9 (L) 1.0 - 2.0    Patient Fasting for CMP? Yes    Lipid in 6 months    Result Value Ref Range    Cholesterol, Total 179 <200 mg/dL    HDL Cholesterol 57 40 - 59 mg/dL    Triglycerides 138 30 - 149 mg/dL    LDL Cholesterol 98 <100 mg/dL    VLDL 23 0 - 30 mg/dL    Non HDL Chol 122 <130 mg/dL    Patient Fasting for Lipid? Yes    Hemoglobin A1C in 6 months   Result Value Ref Range    HgbA1C 6.5 (H) <5.7 %    Estimated Average Glucose 140 (H) 68 - 126 mg/dL            ASSESSMENT AND PLAN:  1. Type 2 diabetes mellitus without complication, without long-term current use of insulin (HCC)  A1c 6.5.   Off mounjaro.   Start metformin. Trial 500mg bid, if tolerates go up to 1000mg bid.   - metFORMIN  MG Oral Tablet 24 Hr; Take 1 tablet (500 mg total) by mouth 2 (two) times daily with meals.  Dispense: 180 tablet; Refill: 0  - Microalb/Creat Ratio, Random Urine [E]; Future    2. Pure hypercholesterolemia  Continue statin.     3. LILLI (obstructive sleep apnea)  Not on cpap. Working on weight loss but weight has gone up off mounjaro.     4. History of cold sores  Valtrex prn.     5. Acute maxillary sinusitis, recurrence not specified  - amoxicillin 875 MG Oral Tab; Take 1 tablet (875 mg total) by mouth 2 (two) times daily.  Dispense: 14 tablet; Refill: 0    6. Impacted cerumen of right ear  Use debrox and rtc in 1-2 weeks for ear irrigation.     7. Elevated blood pressure reading  Elevated but improved upon recheck.   Check at home. Bring in cuff and readings to next visit.  She has a pre op scheduled in a few days.     8. Physical exam, annual  Labs prior to cpx in 6 months.   - CBC With Differential With Platelet; Future  - Comp Metabolic Panel; Future  - Lipid Panel; Future  - Hemoglobin A1C; Future  - TSH W Reflex To Free T4; Future        Return in about 6 months (around 10/26/2024) for physical.      Eunice Shepherd MD

## 2024-04-30 ENCOUNTER — OFFICE VISIT (OUTPATIENT)
Dept: INTERNAL MEDICINE CLINIC | Facility: CLINIC | Age: 64
End: 2024-04-30
Payer: COMMERCIAL

## 2024-04-30 VITALS
RESPIRATION RATE: 16 BRPM | SYSTOLIC BLOOD PRESSURE: 142 MMHG | HEART RATE: 68 BPM | BODY MASS INDEX: 39.68 KG/M2 | HEIGHT: 62 IN | OXYGEN SATURATION: 98 % | TEMPERATURE: 98 F | WEIGHT: 215.63 LBS | DIASTOLIC BLOOD PRESSURE: 80 MMHG

## 2024-04-30 DIAGNOSIS — G47.33 OSA (OBSTRUCTIVE SLEEP APNEA): ICD-10-CM

## 2024-04-30 DIAGNOSIS — E11.9 TYPE 2 DIABETES MELLITUS WITHOUT COMPLICATION, WITHOUT LONG-TERM CURRENT USE OF INSULIN (HCC): ICD-10-CM

## 2024-04-30 DIAGNOSIS — R03.0 ELEVATED BLOOD PRESSURE READING: ICD-10-CM

## 2024-04-30 DIAGNOSIS — Z86.19 HISTORY OF COLD SORES: ICD-10-CM

## 2024-04-30 DIAGNOSIS — E66.01 CLASS 2 SEVERE OBESITY DUE TO EXCESS CALORIES WITH SERIOUS COMORBIDITY AND BODY MASS INDEX (BMI) OF 39.0 TO 39.9 IN ADULT (HCC): ICD-10-CM

## 2024-04-30 DIAGNOSIS — E78.00 PURE HYPERCHOLESTEROLEMIA: ICD-10-CM

## 2024-04-30 DIAGNOSIS — H25.813 COMBINED FORMS OF AGE-RELATED CATARACT OF BOTH EYES: ICD-10-CM

## 2024-04-30 DIAGNOSIS — Z01.818 PRE-OPERATIVE EXAMINATION FOR INTERNAL MEDICINE: Primary | ICD-10-CM

## 2024-04-30 PROCEDURE — 99214 OFFICE O/P EST MOD 30 MIN: CPT | Performed by: NURSE PRACTITIONER

## 2024-04-30 PROCEDURE — 96127 BRIEF EMOTIONAL/BEHAV ASSMT: CPT | Performed by: NURSE PRACTITIONER

## 2024-04-30 RX ORDER — LISINOPRIL 5 MG/1
5 TABLET ORAL DAILY
Qty: 90 TABLET | Refills: 0 | Status: SHIPPED | OUTPATIENT
Start: 2024-04-30

## 2024-04-30 NOTE — H&P
[facility]  PRE OP RISK ASSESSMENT        REASON FOR CONSULT: Pre-op risk assessment for surgical procedure right eye cataract surgery on 5/7/24 and left eye cataract surgery on 5/21/24 with MAC anesthesia.    REQUESTING PHYSICIAN: Dr. Rodriguez    CHIEF COMPLAINT:   Chief Complaint   Patient presents with    Pre-Op     Right Eye cataract on 5/7/24 and Left Eye on 5/21/24       HISTORY OF PRESENT ILLNESS:   The patient is a 63 year old female presents for preoperative clearance for the above procedure. She has a history of DM II, obesity, HLD, LILLI, and hx of cold sores.     BP was elevated in office last week and was advised to start checking her BP at home. She bought a BP cuff but has not started checking it yet. Manual BP in office was 148/80, repeat 142/80. BP on her home machine is 164/94. Does have some mild headache but recovering from a sinus infection. Denies any CP or shortness of breath with rest or exertion. She has no known history of CAD or MI. She is independent with all ADLs.    Past Medical History:    Past Medical History:    Asthma ([facility])    Atypical chest pain    Bursitis of hip, right    Cataract    Age related both eyes    Chronic sinusitis    Dermatitis    at waist    Diabetes (HCC)    Dyspnea    secondary to PND    Eye hemorrhage    L subconjunctivial    Frequent urination    GERD (gastroesophageal reflux disease)    Gestational diabetes ([facility])    diet controlled    Heel pain    Hemorrhoids    swelling, occ. bleeding    High cholesterol    Hyperlipidemia    IGT (impaired glucose tolerance)    Infertility associated with anovulation    Lipid screening    Menometrorrhagia    Metabolic syndrome    Muscle spasm    shoulder    Obese    Obesity    LILLI (obstructive sleep apnea)    Mild/positional  AHI 7 ESC    Paresthesia    B/L great toes    Pelvic pain complicating pregnancy (HCC)    x3mo.     Recurrent herpes labialis    Nasal, after URIs    Rib pain on left side    under rib     Sprained ankle    Viral URI with cough    resolving    Wears glasses        Past Surgical History:    Past Surgical History:   Procedure Laterality Date    Ankle fracture surgery Right 2022      10/11/1999    Colonoscopy      2019    Eye surgery  1981    minor      10/11/1999     [facility]    Other surgical history  1998    myomectomy       Current Medications:    Current Outpatient Medications   Medication Sig Dispense Refill    psyllium 28 % Oral Powd Pack Take 1 packet by mouth daily.      lisinopril 5 MG Oral Tab Take 1 tablet (5 mg total) by mouth daily. 90 tablet 0    Prednisolon-Moxiflox-Bromfenac 1-0.5-0.075 % Ophthalmic Solution       NON FORMULARY Viviscal tablets. Take one daily      metFORMIN  MG Oral Tablet 24 Hr Take 1 tablet (500 mg total) by mouth 2 (two) times daily with meals. 180 tablet 0    amoxicillin 875 MG Oral Tab Take 1 tablet (875 mg total) by mouth 2 (two) times daily. 14 tablet 0    VALACYCLOVIR 1 G Oral Tab TAKE 1 TABLET BY MOUTH EVERY 12 HOURS 2 tablet 1    atorvastatin 20 MG Oral Tab TAKE 1 TABLET(20 MG) BY MOUTH DAILY 90 tablet 1    Accu-Chek FastClix Lancets Does not apply Misc Check twice daily 204 each 3    Lancets Misc. (ACCU-CHEK FASTCLIX LANCET) Does not apply Kit Use twice daily with fastclix lancets 1 kit 1    Blood Glucose Monitoring Suppl (ACCU-CHEK GUIDE) w/Device Does not apply Kit Use as directed daily. 1 kit 0    Cholecalciferol (VITAMIN D3) 75 MCG (3000 UT) Oral Tab Take 1 tablet by mouth daily.         Allergies:    Allergies as of 2024    (No Known Allergies)       SOCIAL HISTORY:   Social History     Socioeconomic History    Marital status:      Spouse name: Not on file    Number of children: Not on file    Years of education: Not on file    Highest education level: Not on file   Occupational History    Not on file   Tobacco Use    Smoking status: Never     Passive exposure: Never    Smokeless tobacco:  Never   Vaping Use    Vaping status: Never Used   Substance and Sexual Activity    Alcohol use: Yes     Comment: Not often    Drug use: Never    Sexual activity: Yes     Partners: Male   Other Topics Concern     Service Not Asked    Blood Transfusions Not Asked    Caffeine Concern Yes     Comment: 1-2 cups coffee daily    Occupational Exposure Not Asked    Hobby Hazards Not Asked    Sleep Concern Not Asked    Stress Concern No    Weight Concern Yes     Comment: Weight loss Clinic    Special Diet No    Back Care Not Asked    Exercise Yes     Comment: 2023 physical therapy    Bike Helmet Not Asked    Seat Belt Yes    Self-Exams Not Asked   Social History Narrative    Not on file     Social Determinants of Health     Financial Resource Strain: Not on file   Food Insecurity: Not on file   Transportation Needs: Not on file   Physical Activity: Not on file   Stress: Not on file   Social Connections: Not on file   Housing Stability: Not on file        FAMILY HISTORY:   Family History   Problem Relation Age of Onset    Heart Attack Father         50's    Diabetes Father         Type 2    Glaucoma Maternal Grandmother     Other (Other) Maternal Grandmother     High Blood Pressure Mother     Heart Attack Brother 40    Glaucoma Maternal Uncle     Diabetes Brother         Type 2        REVIEW OF SYSTEMS:  GENERAL: feels well otherwise  SKIN: denies any unusual skin lesions  EYES: denies blurred vision or double vision  HEENT: mild nasal congestion+, mild cough but improving, on amoxicillin   LUNGS: denies shortness of breath with exertion  CARDIOVASCULAR: denies chest pain on exertion  GI: denies abdominal pain, denies heartburn  : denies dysuria  MUSCULOSKELETAL: denies back pain  NEURO: denies headaches  PSYCHE: denies depression or anxiety  HEMATOLOGIC: denies hx of anemia  ENDOCRINE: denies thyroid history  ALL/ASTHMA: denies hx of allergy or asthma  PRE-OP ROS  NSAIDS/PLATELET INH: None  DIABETIC MEDICATIONS:  Yes, on metformin. Advised to hold on the day of surgery.   LILLI: Yes, not on a cpap.   Hx of VTE: None  BLEEDING DISORDER: None  LOOSE DENTITION OR DENTAL APPLIANCES: None  URI, COUGH, CP, FEVER: None  CERVICAL SPINE RESTRICTION: None    PHYSICAL EXAM:  /80 (BP Location: Left arm, Patient Position: Sitting, Cuff Size: adult)   Pulse 68   Temp 97.5 °F (36.4 °C) (Temporal)   Resp 16   Ht 5' 2\" (1.575 m)   Wt 215 lb 9.6 oz (97.8 kg)   SpO2 98%   BMI 39.43 kg/m²   Body mass index is 39.43 kg/m².   GENERAL: well developed, well nourished, in no apparent distress  SKIN: no rashes, no suspicious lesions  HEENT: atraumatic, normocephalic, ears are clear. No frontal or maxillary sinus tenderness with palpation.   EYES: PERRLA, EOMI, conjunctiva are clear  NECK: supple, no adenopathy, no bruits  LUNGS: clear to auscultation; no rhonchi, rales, or wheezing  CARDIO: RRR without murmur  GI: good BS's, no masses, HSM or tenderness  MUSCULOSKELETAL: No obvious joint deformity or swelling. Normal gait.  EXTREMITIES: no cyanosis, clubbing or edema  NEURO: Oriented times three, cranial nerves are grossly intact, motor and sensory are grossly intact    LABS:  Lab Results   Component Value Date    WBC 6.9 10/10/2023    RBC 4.64 10/10/2023    HGB 14.1 10/10/2023    HCT 42.2 10/10/2023    MCV 90.9 10/10/2023    MCH 30.4 10/10/2023    MCHC 33.4 10/10/2023    RDW 12.4 10/10/2023     10/10/2023      Lab Results   Component Value Date     (H) 04/23/2024    BUN 14 04/23/2024    BUNCREA SEE NOTE: 10/10/2023    CREATSERUM 0.79 04/23/2024    ANIONGAP 6 04/23/2024    GFR 99 04/28/2017    GFRNAA 101 05/02/2023    GFRAA 110 02/14/2022    CA 9.6 04/23/2024    OSMOCALC 291 04/23/2024    ALKPHO 122 04/23/2024    AST 18 04/23/2024    ALT 29 04/23/2024    BILT 1.2 04/23/2024    TP 7.4 04/23/2024    ALB 3.6 04/23/2024    GLOBULIN 3.8 04/23/2024    AGRATIO 1.4 10/10/2023     04/23/2024    K 3.8 04/23/2024      04/23/2024    CO2 27.0 04/23/2024      Lab Results   Component Value Date    CHOLEST 179 04/23/2024    TRIG 138 04/23/2024    HDL 57 04/23/2024    LDL 98 04/23/2024    VLDL 23 04/23/2024    TCHDLRATIO 2.6 10/10/2023    NONHDLC 122 04/23/2024      Lab Results   Component Value Date    TSH 1.130 05/15/2019    TSHT4 1.70 10/10/2023      Lab Results   Component Value Date     (H) 04/23/2024    A1C 6.5 (H) 04/23/2024        IMAGING:   No results found.     ASSESSMENT AND PLAN:      1. Pre-operative examination for internal medicine  2. Combined forms of age-related cataract of both eyes    The patient has cataract of both eyes scheduled for right eye cataract surgery on 5/7/24 and left eye cataract surgery on 5/21/24 with Dr. Rodriguez under MAC anesthesia.    The patient has a functional capacity of greater than 4 METs. Her estimated perioperative risk for major adverse cardiac event is average based on combined clinical and surgical risks. She can therefore proceed with the surgery with acceptable cardiac risk. She is being started on lisinopril 5 mg for her elevated blood pressure. She is advised to return in 3-4 days for blood pressure check.     3. Elevated blood pressure reading  -Home BP machine is not accurate. She will buy a different machine/cuff  -Start Lisinopril 5 mg daily, SE discussed  -Low salt diet  -Monitor BP at home and record in log. Bring log, BP machine/cuff to next visit   - lisinopril 5 MG Oral Tab; Take 1 tablet (5 mg total) by mouth daily.  Dispense: 90 tablet; Refill: 0    4. Type 2 diabetes mellitus without complication, without long-term current use of insulin (HCC)  -Continue Metformin, advised to hold on the day of surgery. Can resume once able to eat  -Low carb diet and regular exercise as tolerated     5. Class 2 severe obesity due to excess calories with serious comorbidity and body mass index (BMI) of 39.0 to 39.9 in adult (HCC)  -Low carb diet and regular exercise as tolerated     6.  Pure hypercholesterolemia  -Continue statin    7. LILLI (obstructive sleep apnea)  -Not on cpap. Working on weight loss    8. History of cold sores  -Stable, uses valtrex prn for flare ups    This consult was sent back to the referring physician.    Return in about 3 days (around 5/3/2024) for bp check.    Negar Love, EARLE  4/30/2024  9:55 AM

## 2024-04-30 NOTE — PATIENT INSTRUCTIONS
Start Lisinopril 5 mg once daily. Monitor for any side effects.    Get a new blood pressure monitor and record BP in a log. Bring log, BP cuff/machine to your next visit.    Stop NSAIDs such as Ibuprofen, Aleve, Advil one week before surgery.    Monitor for signs of infection (redness, warmth, swelling, drainage, pain) after surgery.    Monitor for signs of pneumonia after surgery (shortness of breath, productive cough, fever, chills).    Monitor for signs of blood clot after surgery (leg pain, swelling, warmth, redness).    Monitor for constipation and use stool softeners/laxatives as needed.

## 2024-05-01 DIAGNOSIS — E78.00 PURE HYPERCHOLESTEROLEMIA: ICD-10-CM

## 2024-05-02 ENCOUNTER — TELEPHONE (OUTPATIENT)
Dept: INTERNAL MEDICINE CLINIC | Facility: CLINIC | Age: 64
End: 2024-05-02

## 2024-05-03 ENCOUNTER — OFFICE VISIT (OUTPATIENT)
Dept: INTERNAL MEDICINE CLINIC | Facility: CLINIC | Age: 64
End: 2024-05-03
Payer: COMMERCIAL

## 2024-05-03 VITALS
DIASTOLIC BLOOD PRESSURE: 78 MMHG | BODY MASS INDEX: 39.75 KG/M2 | HEART RATE: 84 BPM | OXYGEN SATURATION: 98 % | RESPIRATION RATE: 16 BRPM | WEIGHT: 216 LBS | SYSTOLIC BLOOD PRESSURE: 138 MMHG | HEIGHT: 62 IN

## 2024-05-03 DIAGNOSIS — I10 PRIMARY HYPERTENSION: Primary | ICD-10-CM

## 2024-05-03 PROCEDURE — 99214 OFFICE O/P EST MOD 30 MIN: CPT | Performed by: NURSE PRACTITIONER

## 2024-05-03 RX ORDER — LORATADINE 10 MG/1
10 TABLET ORAL DAILY
COMMUNITY
Start: 2024-05-02

## 2024-05-03 NOTE — PROGRESS NOTES
CHIEF COMPLAINT:     Chief Complaint   Patient presents with    Hypertension     This AM's readings: 140/82, 128/80 20 min.apart. Feeling fine    Follow - Up       HPI:   Jennie Bullock is a 63 year old female coming in for BP check.    She was started on Lisinopril for elevated BP, tolerating it well. Reports her BP at home around between 120-140/80-90. No chest pain, shortness of breath, or palpitations. She is getting cataract surgery done 24.     Past Medical History:    Asthma ([facility])    Atypical chest pain    Bursitis of hip, right    Cataract    Age related both eyes    Chronic sinusitis    Dermatitis    at waist    Diabetes ([facility])    Dyspnea    secondary to PND    Eye hemorrhage    L subconjunctivial    Frequent urination    GERD (gastroesophageal reflux disease)    Gestational diabetes ([facility])    diet controlled    Heel pain    Hemorrhoids    swelling, occ. bleeding    High cholesterol    Hyperlipidemia    IGT (impaired glucose tolerance)    Infertility associated with anovulation    Lipid screening    Menometrorrhagia    Metabolic syndrome    Muscle spasm    shoulder    Obese    Obesity    LILLI (obstructive sleep apnea)    Mild/positional  AHI 7 ESC    Paresthesia    B/L great toes    Pelvic pain complicating pregnancy ([facility])    x3mo.     Recurrent herpes labialis    Nasal, after URIs    Rib pain on left side    under rib    Sprained ankle    Viral URI with cough    resolving    Wears glasses      Past Surgical History:   Procedure Laterality Date    Ankle fracture surgery Right 2022      10/11/1999    Colonoscopy      2019    Eye surgery  1981    minor      10/11/1999     [facility]    Other surgical history  1998    myomectomy      Social History:  Social History     Socioeconomic History    Marital status:    Tobacco Use    Smoking status: Never     Passive exposure: Never    Smokeless tobacco: Never   Vaping Use    Vaping status: Never Used    Substance and Sexual Activity    Alcohol use: Yes     Comment: Not often    Drug use: Never    Sexual activity: Yes     Partners: Male   Other Topics Concern    Caffeine Concern Yes     Comment: 1-2 cups coffee daily    Stress Concern No    Weight Concern Yes     Comment: Weight loss Clinic    Special Diet No    Exercise Yes     Comment: 2023 physical therapy    Seat Belt Yes      Family History:  Family History   Problem Relation Age of Onset    Heart Attack Father         50's    Diabetes Father         Type 2    Glaucoma Maternal Grandmother     Other (Other) Maternal Grandmother     High Blood Pressure Mother     Heart Attack Brother 40    Glaucoma Maternal Uncle     Diabetes Brother         Type 2      Allergies:  No Known Allergies   Current Meds:  Current Outpatient Medications   Medication Sig Dispense Refill    loratadine (CLARITIN) 10 MG Oral Tab Take 1 tablet (10 mg total) by mouth daily.      psyllium 28 % Oral Powd Pack Take 1 packet by mouth daily.      lisinopril 5 MG Oral Tab Take 1 tablet (5 mg total) by mouth daily. 90 tablet 0    Prednisolon-Moxiflox-Bromfenac 1-0.5-0.075 % Ophthalmic Solution       NON FORMULARY Viviscal tablets. Take one daily      metFORMIN  MG Oral Tablet 24 Hr Take 1 tablet (500 mg total) by mouth 2 (two) times daily with meals. 180 tablet 0    VALACYCLOVIR 1 G Oral Tab TAKE 1 TABLET BY MOUTH EVERY 12 HOURS 2 tablet 1    atorvastatin 20 MG Oral Tab TAKE 1 TABLET(20 MG) BY MOUTH DAILY 90 tablet 1    Accu-Chek FastClix Lancets Does not apply Misc Check twice daily 204 each 3    Lancets Misc. (ACCU-CHEK FASTCLIX LANCET) Does not apply Kit Use twice daily with fastclix lancets 1 kit 1    Blood Glucose Monitoring Suppl (ACCU-CHEK GUIDE) w/Device Does not apply Kit Use as directed daily. 1 kit 0    Cholecalciferol (VITAMIN D3) 75 MCG (3000 UT) Oral Tab Take 1 tablet by mouth daily.         Counseling given: Not Answered       REVIEW OF SYSTEMS:   See HPI.     EXAM:     BP  138/78 (BP Location: Left arm, Patient Position: Sitting, Cuff Size: adult)   Pulse 84   Resp 16   Ht 5' 2\" (1.575 m)   Wt 216 lb (98 kg)   SpO2 98%   BMI 39.51 kg/m²   Body mass index is 39.51 kg/m².   Vital signs reviewed. Appears stated age, well groomed, in no acute distress.  Physical Exam:  GENERAL: Patient is alert, awake and oriented, well developed, well nourished.  HEENT: Head: Normocephalic, atraumatic.   HEART: RRR without murmur.  LUNGS: Clear to auscultation bilaterally, no rales/rhonchi/wheezing.  ABDOMEN: good BS's, no masses, HSM or tenderness  MUSCULOSKELETAL: No obvious joint deformity or swelling.   EXTREMITIES: No edema, no cyanosis, no clubbing  NEURO: Oriented time three.     LABS:      Lab Results   Component Value Date    WBC 6.9 10/10/2023    RBC 4.64 10/10/2023    HGB 14.1 10/10/2023    HCT 42.2 10/10/2023    MCV 90.9 10/10/2023    MCH 30.4 10/10/2023    MCHC 33.4 10/10/2023    RDW 12.4 10/10/2023     10/10/2023      Lab Results   Component Value Date     (H) 04/23/2024    BUN 14 04/23/2024    BUNCREA SEE NOTE: 10/10/2023    CREATSERUM 0.79 04/23/2024    ANIONGAP 6 04/23/2024    GFR 99 04/28/2017    GFRNAA 101 05/02/2023    GFRAA 110 02/14/2022    CA 9.6 04/23/2024    OSMOCALC 291 04/23/2024    ALKPHO 122 04/23/2024    AST 18 04/23/2024    ALT 29 04/23/2024    BILT 1.2 04/23/2024    TP 7.4 04/23/2024    ALB 3.6 04/23/2024    GLOBULIN 3.8 04/23/2024    AGRATIO 1.4 10/10/2023     04/23/2024    K 3.8 04/23/2024     04/23/2024    CO2 27.0 04/23/2024      Lab Results   Component Value Date    CHOLEST 179 04/23/2024    TRIG 138 04/23/2024    HDL 57 04/23/2024    LDL 98 04/23/2024    VLDL 23 04/23/2024    TCHDLRATIO 2.6 10/10/2023    NONHDLC 122 04/23/2024      Lab Results   Component Value Date    TSH 1.130 05/15/2019    TSHT4 1.70 10/10/2023      Lab Results   Component Value Date     (H) 04/23/2024    A1C 6.5 (H) 04/23/2024        IMAGING:     No results  found.     ASSESSMENT AND PLAN:   1. Primary hypertension  -Initial BP elevated, recheck lower  -Her new BP machine was calibrated and accurate  -Continue Lisinopril 5 mg daily for now  -Advised to increase to 10 mg if  BP is staying >140/90 on the day of surgery  -Can proceed with surgery. See pre-op visit note    The patient indicates understanding of these issues and agrees to the plan.  Return in about 5 months (around 10/3/2024) for physical.    Negar Love, EARLE  5/3/2024

## 2024-05-03 NOTE — PROGRESS NOTES
Pre-Op Documents have been Faxed and Confirmation Received.    Faxed to Katelin @ [facility] @ [phone]    Faxed to Maria R @ [facility] @ [phone]

## 2024-05-03 NOTE — TELEPHONE ENCOUNTER
Pre-Op Documents have been Faxed and Confirmation Received.     Faxed to Katelin @ [facility] @ [phone]     Faxed to Maria R @ [facility] @ [phone]

## 2024-05-03 NOTE — H&P
Patient was seen in office for blood pressure re-check. Initial BP was slightly elevated, recheck was lower 138/82. She can therefore proceed with the surgery with acceptable cardiac risk.    EARLE Bermudez  05/03/24

## 2024-05-06 RX ORDER — ATORVASTATIN CALCIUM 20 MG/1
TABLET, FILM COATED ORAL
Qty: 90 TABLET | Refills: 1 | OUTPATIENT
Start: 2024-05-06

## 2024-07-23 DIAGNOSIS — E11.9 TYPE 2 DIABETES MELLITUS WITHOUT COMPLICATION, WITHOUT LONG-TERM CURRENT USE OF INSULIN (HCC): ICD-10-CM

## 2024-07-23 DIAGNOSIS — E78.00 PURE HYPERCHOLESTEROLEMIA: ICD-10-CM

## 2024-07-24 DIAGNOSIS — R03.0 ELEVATED BLOOD PRESSURE READING: ICD-10-CM

## 2024-07-24 RX ORDER — LISINOPRIL 5 MG/1
5 TABLET ORAL DAILY
Qty: 90 TABLET | Refills: 0 | Status: SHIPPED | OUTPATIENT
Start: 2024-07-24

## 2024-07-24 RX ORDER — METFORMIN HYDROCHLORIDE 500 MG/1
500 TABLET, EXTENDED RELEASE ORAL 2 TIMES DAILY WITH MEALS
Qty: 180 TABLET | Refills: 0 | Status: SHIPPED | OUTPATIENT
Start: 2024-07-24

## 2024-07-24 RX ORDER — ATORVASTATIN CALCIUM 20 MG/1
20 TABLET, FILM COATED ORAL DAILY
Qty: 90 TABLET | Refills: 0 | Status: SHIPPED | OUTPATIENT
Start: 2024-07-24

## 2024-07-24 NOTE — TELEPHONE ENCOUNTER
Diabetes Medication Protocol [id number]/23/2024 07:23 PM   Protocol Details Last A1C < 7.5 and within past 6 months    In person appointment or virtual visit in the past 6 mos or appointment in next 3 mos    Microalbumin procedure in past 12 months or taking ACE/ARB    EGFRCR or GFRNAA > 50    GFR in the past 12 months      type 2 diabetes mellitus without complication, without long-term current use of insulin (HCC)  -Continue Metformin, advised to hold on the day of surgery. Can resume once able to eat  -Low carb diet and regular exercise as tolerated   No future appointments.

## 2024-07-24 NOTE — TELEPHONE ENCOUNTER
Hypertension Medications Protocol [id number]/24/2024 08:48 AM   Protocol Details CMP or BMP in past 12 months    Last BP reading less than 140/90    In person appointment or virtual visit in the past 12 mos or appointment in next 3 mos    EGFRCR or GFRNAA > 50      1. Primary hypertension  -Initial BP elevated, recheck lower  -Her new BP machine was calibrated and accurate  -Continue Lisinopril 5 mg daily for now  No future appointments.

## 2024-07-24 NOTE — TELEPHONE ENCOUNTER
Cholesterol Medication Protocol [id number]/23/2024 07:24 PM   Protocol Details ALT < 80    ALT resulted within past year    Lipid panel within past 12 months    In person appointment or virtual visit in the past 12 mos or appointment in next 3 mos      Pure hypercholesterolemia  -Continue statin   No future appointments.

## 2024-08-09 ENCOUNTER — TELEMEDICINE (OUTPATIENT)
Dept: INTERNAL MEDICINE CLINIC | Facility: CLINIC | Age: 64
End: 2024-08-09
Payer: COMMERCIAL

## 2024-08-09 DIAGNOSIS — U07.1 COVID-19 VIRUS INFECTION: Primary | ICD-10-CM

## 2024-08-09 NOTE — PATIENT INSTRUCTIONS
[facility] on [address] will have paxlovid.     Take Dayquil and nyquil for symptoms. Avoid over the counter medications with pseudophed because it will increase your blood pressure.     Hold atorvastatin for the 5 days you are on this medication.     Tessalon pearls for cough.   Claritin for post nasal drip.   Honey and lozenges. Salt water gargling.

## 2024-08-09 NOTE — PROGRESS NOTES
Virtual Telephone Check-In    Jennie Bullock verbally consents to a Virtual/Telephone Check-In visit on 24.  Patient has been referred to the [facility] website at [email or web address] to review the yearly Consent to Treat document.    Patient understands and accepts financial responsibility for any deductible, co-insurance and/or co-pays associated with this service.    CHIEF COMPLAINT:    Chief Complaint   Patient presents with    Viral Syndrome     Congestion, Cough, runny nose. Symptom started 3 days ago. Pt taking over the counter Advil.            HPI & A/P: Audio and video visit done.     The patient is a 63 year old female with pmhx of HTN, HLD, T2DM, obesity, LILLI who presents with URI symptoms.    //COVID-19 URI   Symptoms started on Tuesday.  tested on positive on Monday. Started out with cold, cough, sore throat. Sore throat now gone. Cough has been persistent especially when laying down. Feels today almost feeling better. Runny nose now, cough is productive. Last night felt a bit of chest pan in her lower back when she threw up from a bad cough. No chest pain now or with exertion. Denies shortness of breath, fever, chills, night sweats. Did not check fever. Symptoms for 3 days. Tested positive today.   Medications: Advil, benadryl.   Has taken paxlovid before.   PLAN:   Hold atorvastatin and start paxlovid x 5 days. No renal impairment. Patient is a candidate with metabolic syndrome. Has tolerated well in the past. Discussed over the counter remedies.   -She has tessalon pearls and will check to make sure it has not . If it has, she will mychart us for a new prescription.     //HLD  PLAN:   Hold statin as she is no paxlovid, to restart once completed paxlovid treatment.     Return if symptoms worsen or fail to improve.    Krysten Leon MD  Internal Medicine     REVIEW OF SYSTEMS:  See HPI    Current Medications:    Current Outpatient Medications   Medication Sig Dispense Refill     nirmatrelvir-ritonavir 300-100 MG Oral Tablet Therapy Pack Take two nirmatrelvir tablets (300mg) with one ritonavir tablet (100mg) together twice daily for 5 days. 30 tablet 0    METFORMIN  MG Oral Tablet 24 Hr TAKE 1 TABLET(500 MG) BY MOUTH TWICE DAILY WITH MEALS 180 tablet 0    atorvastatin 20 MG Oral Tab Take 1 tablet (20 mg total) by mouth daily. 90 tablet 0    LISINOPRIL 5 MG Oral Tab TAKE 1 TABLET(5 MG) BY MOUTH DAILY 90 tablet 0    loratadine (CLARITIN) 10 MG Oral Tab Take 1 tablet (10 mg total) by mouth daily.      psyllium 28 % Oral Powd Pack Take 1 packet by mouth daily.      Prednisolon-Moxiflox-Bromfenac 1-0.5-0.075 % Ophthalmic Solution       NON FORMULARY Viviscal tablets. Take one daily      VALACYCLOVIR 1 G Oral Tab TAKE 1 TABLET BY MOUTH EVERY 12 HOURS 2 tablet 1    Accu-Chek FastClix Lancets Does not apply Misc Check twice daily 204 each 3    Lancets Misc. (ACCU-CHEK FASTCLIX LANCET) Does not apply Kit Use twice daily with fastclix lancets 1 kit 1    Blood Glucose Monitoring Suppl (ACCU-CHEK GUIDE) w/Device Does not apply Kit Use as directed daily. 1 kit 0    Cholecalciferol (VITAMIN D3) 75 MCG (3000 UT) Oral Tab Take 1 tablet by mouth daily.         PAST MEDICAL, SOCIAL, AND FAMILY HISTORY:  Tobacco:    History   Smoking Status    Never   Smokeless Tobacco    Never       PHYSICAL EXAM:  There were no vitals taken for this visit.     DATA:  Results for orders placed or performed in visit on 04/23/24   CMP in 6 months   Result Value Ref Range    Glucose 110 (H) 70 - 99 mg/dL    Sodium 140 136 - 145 mmol/L    Potassium 3.8 3.5 - 5.1 mmol/L    Chloride 107 98 - 112 mmol/L    CO2 27.0 21.0 - 32.0 mmol/L    Anion Gap 6 0 - 18 mmol/L    BUN 14 9 - 23 mg/dL    Creatinine 0.79 0.55 - 1.02 mg/dL    Calcium, Total 9.6 8.5 - 10.1 mg/dL    Calculated Osmolality 291 275 - 295 mOsm/kg    eGFR-Cr 84 >=60 mL/min/1.73m2    AST 18 15 - 37 U/L    ALT 29 13 - 56 U/L    Alkaline Phosphatase 122 50 - 130 U/L     Bilirubin, Total 1.2 0.1 - 2.0 mg/dL    Total Protein 7.4 6.4 - 8.2 g/dL    Albumin 3.6 3.4 - 5.0 g/dL    Globulin  3.8 2.8 - 4.4 g/dL    A/G Ratio 0.9 (L) 1.0 - 2.0    Patient Fasting for CMP? Yes    Lipid in 6 months   Result Value Ref Range    Cholesterol, Total 179 <200 mg/dL    HDL Cholesterol 57 40 - 59 mg/dL    Triglycerides 138 30 - 149 mg/dL    LDL Cholesterol 98 <100 mg/dL    VLDL 23 0 - 30 mg/dL    Non HDL Chol 122 <130 mg/dL    Patient Fasting for Lipid? Yes    Hemoglobin A1C in 6 months   Result Value Ref Range    HgbA1C 6.5 (H) <5.7 %    Estimated Average Glucose 140 (H) 68 - 126 mg/dL        Pt understands phone/video evaluation is not a substitute for face to face examination or emergency care. Pt advised to go to the ER or call 911 for worsening symptoms or acute distress.      Please note that the following visit was completed using two-way, real-time interactive audio and/or video communication.  This has been done in good eliu to provide continuity of care in the best interest of the provider-patient relationship, due to the ongoing public health crisis/national emergency and because of restrictions of visitation.  There are limitations of this visit as no physical exam could be performed.  Every conscious effort was taken to allow for sufficient and adequate time.  This billing was spent on reviewing labs, medications, radiology tests and decision making.  Appropriate medical decision-making and tests are ordered as detailed in the plan of care above.

## 2024-08-19 ENCOUNTER — PATIENT MESSAGE (OUTPATIENT)
Dept: INTERNAL MEDICINE CLINIC | Facility: CLINIC | Age: 64
End: 2024-08-19

## 2024-08-19 DIAGNOSIS — Z12.31 ENCOUNTER FOR SCREENING MAMMOGRAM FOR BREAST CANCER: Primary | ICD-10-CM

## 2024-08-20 NOTE — TELEPHONE ENCOUNTER
From: Jennie Bullock  To: Eunice Shepherd  Sent: 8/19/2024 11:24 AM CDT  Subject: Mammogram order    Dr. Shepherd , my last mammogram was last September. I usually have one every year. Do you want me to schedule one for this year?

## 2024-09-09 ENCOUNTER — PATIENT MESSAGE (OUTPATIENT)
Dept: INTERNAL MEDICINE CLINIC | Facility: CLINIC | Age: 64
End: 2024-09-09

## 2024-09-09 DIAGNOSIS — E11.9 TYPE 2 DIABETES MELLITUS WITHOUT COMPLICATION, WITHOUT LONG-TERM CURRENT USE OF INSULIN (HCC): ICD-10-CM

## 2024-09-10 RX ORDER — METFORMIN HCL 500 MG
1000 TABLET, EXTENDED RELEASE 24 HR ORAL 2 TIMES DAILY WITH MEALS
Qty: 360 TABLET | Refills: 0 | Status: SHIPPED | OUTPATIENT
Start: 2024-09-10

## 2024-09-10 NOTE — TELEPHONE ENCOUNTER
From: Jennie Bullock  To: Eunice Shepherd  Sent: 9/9/2024 8:34 AM CDT  Subject: Metfornin    I need a new prescription for Metfornin for 1000mg twice a day. I have doubled the original prescription as requested.  Thanks

## 2024-09-24 ENCOUNTER — HOSPITAL ENCOUNTER (OUTPATIENT)
Dept: MAMMOGRAPHY | Age: 64
Discharge: HOME | End: 2024-09-24
Attending: INTERNAL MEDICINE
Payer: COMMERCIAL

## 2024-09-24 DIAGNOSIS — Z12.31 ENCOUNTER FOR SCREENING MAMMOGRAM FOR BREAST CANCER: ICD-10-CM

## 2024-09-24 PROCEDURE — 77067 SCR MAMMO BI INCL CAD: CPT | Performed by: INTERNAL MEDICINE

## 2024-09-24 PROCEDURE — 77063 BREAST TOMOSYNTHESIS BI: CPT | Performed by: INTERNAL MEDICINE

## 2024-10-02 ENCOUNTER — LAB ENCOUNTER (OUTPATIENT)
Dept: LAB | Age: 64
End: 2024-10-02
Attending: INTERNAL MEDICINE
Payer: COMMERCIAL

## 2024-10-02 DIAGNOSIS — E11.9 TYPE 2 DIABETES MELLITUS WITHOUT COMPLICATION, WITHOUT LONG-TERM CURRENT USE OF INSULIN (HCC): ICD-10-CM

## 2024-10-02 DIAGNOSIS — Z00.00 PHYSICAL EXAM, ANNUAL: ICD-10-CM

## 2024-10-02 LAB
ALBUMIN SERPL-MCNC: 4.3 G/DL (ref 3.2–4.8)
ALBUMIN/GLOB SERPL: 1.4 {RATIO} (ref 1–2)
ALP LIVER SERPL-CCNC: 103 U/L
ALT SERPL-CCNC: 30 U/L
ANION GAP SERPL CALC-SCNC: 5 MMOL/L (ref 0–18)
AST SERPL-CCNC: 21 U/L (ref ?–34)
BASOPHILS # BLD AUTO: 0.06 X10(3) UL (ref 0–0.2)
BASOPHILS NFR BLD AUTO: 0.7 %
BILIRUB SERPL-MCNC: 1 MG/DL (ref 0.2–1.1)
BUN BLD-MCNC: 14 MG/DL (ref 9–23)
CALCIUM BLD-MCNC: 10.3 MG/DL (ref 8.7–10.4)
CHLORIDE SERPL-SCNC: 105 MMOL/L (ref 98–112)
CHOLEST SERPL-MCNC: 160 MG/DL (ref ?–200)
CO2 SERPL-SCNC: 28 MMOL/L (ref 21–32)
CREAT BLD-MCNC: 0.59 MG/DL
CREAT UR-SCNC: 87.8 MG/DL
EGFRCR SERPLBLD CKD-EPI 2021: 101 ML/MIN/1.73M2 (ref 60–?)
EOSINOPHIL # BLD AUTO: 0.44 X10(3) UL (ref 0–0.7)
EOSINOPHIL NFR BLD AUTO: 5.3 %
ERYTHROCYTE [DISTWIDTH] IN BLOOD BY AUTOMATED COUNT: 12.4 %
EST. AVERAGE GLUCOSE BLD GHB EST-MCNC: 126 MG/DL (ref 68–126)
FASTING PATIENT LIPID ANSWER: YES
FASTING STATUS PATIENT QL REPORTED: YES
GLOBULIN PLAS-MCNC: 3 G/DL (ref 2–3.5)
GLUCOSE BLD-MCNC: 93 MG/DL (ref 70–99)
HBA1C MFR BLD: 6 % (ref ?–5.7)
HCT VFR BLD AUTO: 41.4 %
HDLC SERPL-MCNC: 55 MG/DL (ref 40–59)
HGB BLD-MCNC: 13.7 G/DL
IMM GRANULOCYTES # BLD AUTO: 0.02 X10(3) UL (ref 0–1)
IMM GRANULOCYTES NFR BLD: 0.2 %
LDLC SERPL CALC-MCNC: 84 MG/DL (ref ?–100)
LYMPHOCYTES # BLD AUTO: 2.23 X10(3) UL (ref 1–4)
LYMPHOCYTES NFR BLD AUTO: 27 %
MCH RBC QN AUTO: 30.9 PG (ref 26–34)
MCHC RBC AUTO-ENTMCNC: 33.1 G/DL (ref 31–37)
MCV RBC AUTO: 93.2 FL
MICROALBUMIN UR-MCNC: 0.6 MG/DL
MICROALBUMIN/CREAT 24H UR-RTO: 6.8 UG/MG (ref ?–30)
MONOCYTES # BLD AUTO: 0.51 X10(3) UL (ref 0.1–1)
MONOCYTES NFR BLD AUTO: 6.2 %
NEUTROPHILS # BLD AUTO: 5.01 X10 (3) UL (ref 1.5–7.7)
NEUTROPHILS # BLD AUTO: 5.01 X10(3) UL (ref 1.5–7.7)
NEUTROPHILS NFR BLD AUTO: 60.6 %
NONHDLC SERPL-MCNC: 105 MG/DL (ref ?–130)
OSMOLALITY SERPL CALC.SUM OF ELEC: 286 MOSM/KG (ref 275–295)
PLATELET # BLD AUTO: 333 10(3)UL (ref 150–450)
POTASSIUM SERPL-SCNC: 3.9 MMOL/L (ref 3.5–5.1)
PROT SERPL-MCNC: 7.3 G/DL (ref 5.7–8.2)
RBC # BLD AUTO: 4.44 X10(6)UL
SODIUM SERPL-SCNC: 138 MMOL/L (ref 136–145)
TRIGL SERPL-MCNC: 116 MG/DL (ref 30–149)
TSI SER-ACNC: 1.55 MIU/ML (ref 0.55–4.78)
VLDLC SERPL CALC-MCNC: 18 MG/DL (ref 0–30)
WBC # BLD AUTO: 8.3 X10(3) UL (ref 4–11)

## 2024-10-02 PROCEDURE — 36415 COLL VENOUS BLD VENIPUNCTURE: CPT

## 2024-10-02 PROCEDURE — 80053 COMPREHEN METABOLIC PANEL: CPT

## 2024-10-02 PROCEDURE — 82043 UR ALBUMIN QUANTITATIVE: CPT

## 2024-10-02 PROCEDURE — 85025 COMPLETE CBC W/AUTO DIFF WBC: CPT

## 2024-10-02 PROCEDURE — 84443 ASSAY THYROID STIM HORMONE: CPT

## 2024-10-02 PROCEDURE — 83036 HEMOGLOBIN GLYCOSYLATED A1C: CPT

## 2024-10-02 PROCEDURE — 80061 LIPID PANEL: CPT

## 2024-10-02 PROCEDURE — 82570 ASSAY OF URINE CREATININE: CPT

## 2024-10-22 DIAGNOSIS — R03.0 ELEVATED BLOOD PRESSURE READING: ICD-10-CM

## 2024-10-22 DIAGNOSIS — E78.00 PURE HYPERCHOLESTEROLEMIA: ICD-10-CM

## 2024-10-22 RX ORDER — LISINOPRIL 5 MG/1
5 TABLET ORAL DAILY
Qty: 90 TABLET | Refills: 0 | Status: SHIPPED | OUTPATIENT
Start: 2024-10-22 | End: 2024-10-28

## 2024-10-24 RX ORDER — ATORVASTATIN CALCIUM 20 MG/1
20 TABLET, FILM COATED ORAL DAILY
Qty: 90 TABLET | Refills: 0 | Status: SHIPPED | OUTPATIENT
Start: 2024-10-24

## 2024-10-24 NOTE — TELEPHONE ENCOUNTER
Cholesterol Medication Protocol [id number]/22/2024 08:40 AM   Protocol Details ALT < 80    ALT resulted within past year    Lipid panel within past 12 months    In person appointment or virtual visit in the past 12 mos or appointment in next 3 mos   . Pure hypercholesterolemia  -Continue statin  Future Appointments   Date Time Provider Department Center   10/28/2024 10:40 AM Eunice Shepherd MD [facility]

## 2024-10-27 NOTE — PROGRESS NOTES
[facility]    CHIEF COMPLAINT:   Chief Complaint   Patient presents with    Routine Physical     10/30/20-normal pap, neg HPV. 9/24/24-mammo. 9/9/19-colon-repeat 10 years 9/9/24-eye exam 4/26/24-foot exam.          HPI:   Jennie Bullock is a 63 year old female who presents for a complete physical exam. Symptoms: denies discharge, itching, burning or dysuria.     mammo just done. Negative. Ordered for next year.   Pap done 10/2020. Negative with negative hpv.    Colonoscopy up to date. Done 9/2019, repeat in 10 years. One polyp, hyperplastic.   Dexa done 10/2023 was normal.      Up to date tdap, shingrix, prevnar 20, rsv.   Get covid booster at her local pharmacy.   Flu shot done today.     Exercise: not exercising much. She will work on this.     Derm: she will make an appt with dermatology.     On meds for dm, hyperlipidemia.   Joselito not on cpap, mild.   Uses valtrex prn for cold sores.     Labs done. Reviewed.   Eye exam, foot exam up to date.     Bp elevated today. Does not have readings from home.     Had covid in 8/2024. Had a cough then. The cough got better but she still feels a bit of a lingering cough daily. She has a little post nasal drip or a tickle but she has never really had allergies. Wondering if it could be due to meds. She is on lisinopril.       Wt Readings from Last 6 Encounters:   10/28/24 213 lb 9.6 oz (96.9 kg)   05/03/24 216 lb (98 kg)   04/30/24 215 lb 9.6 oz (97.8 kg)   04/26/24 215 lb 6.4 oz (97.7 kg)   10/23/23 204 lb 4.8 oz (92.7 kg)   08/21/23 194 lb 9.6 oz (88.3 kg)     Body mass index is 39.07 kg/m².       Current Outpatient Medications   Medication Sig Dispense Refill    losartan 25 MG Oral Tab Take 1 tablet (25 mg total) by mouth daily. 90 tablet 0    Accu-Chek FastClix Lancets Does not apply Misc Check once daily 102 each 3    ATORVASTATIN 20 MG Oral Tab TAKE 1 TABLET(20 MG) BY MOUTH DAILY 90 tablet 0    metFORMIN  MG Oral Tablet 24 Hr Take 2 tablets (1,000 mg  total) by mouth 2 (two) times daily with meals. 360 tablet 0    loratadine (CLARITIN) 10 MG Oral Tab Take 1 tablet (10 mg total) by mouth daily as needed.      psyllium 28 % Oral Powd Pack Take 1 packet by mouth as needed.      NON FORMULARY Viviscal tablets. Take one daily      Blood Glucose Monitoring Suppl (ACCU-CHEK GUIDE) w/Device Does not apply Kit Use as directed daily. 1 kit 0    Cholecalciferol (VITAMIN D3) 75 MCG (3000 UT) Oral Tab Take 1 tablet by mouth daily.      VALACYCLOVIR 1 G Oral Tab TAKE 1 TABLET BY MOUTH EVERY 12 HOURS (Patient not taking: Reported on 10/28/2024) 2 tablet 1      Past Medical History:    Asthma ([facility])    Atypical chest pain    Bursitis of hip, right    Cataract    Age related both eyes    Chronic sinusitis    Dermatitis    at waist    Diabetes ([facility])    Dyspnea    secondary to PND    Eye hemorrhage    L subconjunctivial    Frequent urination    GERD (gastroesophageal reflux disease)    Gestational diabetes ([facility])    diet controlled    Heel pain    Hemorrhoids    swelling, occ. bleeding    High cholesterol    Hyperlipidemia    IGT (impaired glucose tolerance)    Infertility associated with anovulation    Lipid screening    Menometrorrhagia    Metabolic syndrome    Muscle spasm    shoulder    Obese    Obesity    LILLI (obstructive sleep apnea)    Mild/positional  AHI 7 ESC    Paresthesia    B/L great toes    Pelvic pain complicating pregnancy ([facility])    x3mo.     Recurrent herpes labialis    Nasal, after URIs    Rib pain on left side    under rib    Sprained ankle    Viral URI with cough    resolving    Wears glasses      Past Surgical History:   Procedure Laterality Date    Ankle fracture surgery Right 2022      10/11/1999    Colonoscopy      2019    Eye surgery  1981    minor      10/11/1999     [facility]    Other surgical history  1998    myomectomy      Family History   Problem Relation Age of Onset    Heart Attack Father         50's     Diabetes Father         Type 2    Glaucoma Maternal Grandmother     Other (Other) Maternal Grandmother     High Blood Pressure Mother     Heart Attack Brother 40    Glaucoma Maternal Uncle     Diabetes Brother         Type 2      Social History:   Social History     Socioeconomic History    Marital status:    Tobacco Use    Smoking status: Never     Passive exposure: Never    Smokeless tobacco: Never   Vaping Use    Vaping status: Never Used   Substance and Sexual Activity    Alcohol use: Not Currently     Comment: very rare. Only at special events.    Drug use: Never    Sexual activity: Yes     Partners: Male   Other Topics Concern    Caffeine Concern Yes     Comment: 1-2 cups coffee daily    Stress Concern No    Weight Concern Yes     Comment: Weight loss Clinic    Special Diet No    Exercise Yes     Comment: 2023 physical therapy    Seat Belt Yes     : yes.    Exercise: none.  Diet: watches minimally     REVIEW OF SYSTEMS:   GENERAL: feels well otherwise  SKIN: denies any unusual skin lesions  EYES:denies blurred vision or double vision  HEENT: denies nasal congestion, sinus pain or ST  LUNGS: denies shortness of breath with exertion  CARDIOVASCULAR: denies chest pain on exertion  GI: denies abdominal pain,denies heartburn  : denies dysuria, vaginal discharge or itching  MUSCULOSKELETAL: denies back pain  NEURO: denies headaches  PSYCHE: denies depression or anxiety  HEMATOLOGIC: denies hx of anemia  ENDOCRINE: denies thyroid history  ALL/ASTHMA: denies hx of allergy or asthma    EXAM:   /78   Pulse 76   Temp 97.4 °F (36.3 °C) (Temporal)   Resp 12   Ht 5' 2\" (1.575 m)   Wt 213 lb 9.6 oz (96.9 kg)   Breastfeeding No   BMI 39.07 kg/m²   Body mass index is 39.07 kg/m².   GENERAL: well developed, well nourished,in no apparent distress  SKIN: no rashes,no suspicious lesions  HEENT: atraumatic, normocephalic,ears and throat are clear  EYES:PERRLA, conjunctiva are clear  NECK: supple,no  adenopathy,no bruits  CHEST: no chest tenderness  LUNGS: clear to auscultation  CARDIO: nl s1 and s2, RRR without murmur  GI: good BS's,no masses, HSM or tenderness  BREAST: no dominant or suspicious mass, no nipple discharge  GENITAL/URINARY:  External Genitalia:  General appearance; normal, Hair distribution; normal, Lesions absent, Urethral Meatus:  Size normal, Location normal, Lesions absent, Prolapse absent, Vagina:  General appearance normal, Lesions absent, Pelvic support normal, Cervix:  General appearance normal, Discharge absent, Tenderness absent, Enlargement absent, Uterus:  Masses absent, Adnexa:  Masses absent, Tenderness absent, Anus/Perineum:  Lesions absent and Masses absent  Pap done today.   MUSCULOSKELETAL: back is not tender,FROM of the back  EXTREMITIES: no cyanosis, clubbing or edema  NEURO: Oriented times three,cranial nerves are intact,motor and sensory are grossly intact    Labs:   Lab Results   Component Value Date/Time    WBC 8.3 10/02/2024 09:00 AM    HGB 13.7 10/02/2024 09:00 AM    .0 10/02/2024 09:00 AM      Lab Results   Component Value Date/Time    GLU 93 10/02/2024 09:00 AM     10/02/2024 09:00 AM    K 3.9 10/02/2024 09:00 AM     10/02/2024 09:00 AM    CO2 28.0 10/02/2024 09:00 AM    CREATSERUM 0.59 10/02/2024 09:00 AM    CA 10.3 10/02/2024 09:00 AM    ALB 4.3 10/02/2024 09:00 AM    TP 7.3 10/02/2024 09:00 AM    ALKPHO 103 10/02/2024 09:00 AM    AST 21 10/02/2024 09:00 AM    ALT 30 10/02/2024 09:00 AM    BILT 1.0 10/02/2024 09:00 AM    TSH 1.555 10/02/2024 09:00 AM        Lab Results   Component Value Date/Time    CHOLEST 160 10/02/2024 09:00 AM    HDL 55 10/02/2024 09:00 AM    TRIG 116 10/02/2024 09:00 AM    LDL 84 10/02/2024 09:00 AM    NONHDLC 105 10/02/2024 09:00 AM       Lab Results   Component Value Date/Time    A1C 6.0 (H) 10/02/2024 09:00 AM      Vitamin D:    Lab Results   Component Value Date    VITD 25 (L) 11/09/2020           ASSESSMENT AND PLAN:    Jennie Bullock is a 63 year old female who presents for a complete physical exam.     1. Physical exam, annual  Labs reviewed.   Mammo ordered.   Pap and breast exam done.   Other HM discussed.   Immunizations discussed.   Urged regular exercise.     2. Need for vaccination  - INFLUENZA VACCINE, TRI, PRESERV FREE, 0.5 ML    3. Type 2 diabetes mellitus without complication, without long-term current use of insulin (HCC)  She needs refills for her lancets.   - Accu-Chek FastClix Lancets Does not apply Misc; Check once daily  Dispense: 102 each; Refill: 3    4. Elevated blood pressure reading  Stop lisinopril as may be causing the cough.   Start losartan. Check at home and bring in readings to next visit.   - losartan 25 MG Oral Tab; Take 1 tablet (25 mg total) by mouth daily.  Dispense: 90 tablet; Refill: 0    5. Encounter for screening mammogram for malignant neoplasm of breast  - [facility] DALE 2D+3D SCREENING BILAT (CPT=77067/79275); Future    6. Cough  Sec to lisinopril?  Stop lisinopril. Start losartan.   Check bp at home. Bring in readings and cuff to next visit.       Return in about 1 month (around 11/28/2024) for bp check.      Eunice Shepherd MD

## 2024-10-28 ENCOUNTER — OFFICE VISIT (OUTPATIENT)
Dept: INTERNAL MEDICINE CLINIC | Facility: CLINIC | Age: 64
End: 2024-10-28
Payer: COMMERCIAL

## 2024-10-28 VITALS
SYSTOLIC BLOOD PRESSURE: 150 MMHG | RESPIRATION RATE: 12 BRPM | BODY MASS INDEX: 39.31 KG/M2 | HEIGHT: 62 IN | TEMPERATURE: 97 F | WEIGHT: 213.63 LBS | DIASTOLIC BLOOD PRESSURE: 78 MMHG | HEART RATE: 76 BPM

## 2024-10-28 DIAGNOSIS — Z01.419 VISIT FOR PELVIC EXAM: ICD-10-CM

## 2024-10-28 DIAGNOSIS — E11.9 TYPE 2 DIABETES MELLITUS WITHOUT COMPLICATION, WITHOUT LONG-TERM CURRENT USE OF INSULIN (HCC): ICD-10-CM

## 2024-10-28 DIAGNOSIS — Z23 NEED FOR VACCINATION: ICD-10-CM

## 2024-10-28 DIAGNOSIS — Z12.31 ENCOUNTER FOR SCREENING MAMMOGRAM FOR MALIGNANT NEOPLASM OF BREAST: ICD-10-CM

## 2024-10-28 DIAGNOSIS — R03.0 ELEVATED BLOOD PRESSURE READING: ICD-10-CM

## 2024-10-28 DIAGNOSIS — Z00.00 PHYSICAL EXAM, ANNUAL: ICD-10-CM

## 2024-10-28 DIAGNOSIS — R05.3 CHRONIC COUGH: Primary | ICD-10-CM

## 2024-10-28 PROCEDURE — 88175 CYTOPATH C/V AUTO FLUID REDO: CPT | Performed by: INTERNAL MEDICINE

## 2024-10-28 PROCEDURE — 87624 HPV HI-RISK TYP POOLED RSLT: CPT | Performed by: INTERNAL MEDICINE

## 2024-10-28 RX ORDER — LOSARTAN POTASSIUM 25 MG/1
25 TABLET ORAL DAILY
Qty: 90 TABLET | Refills: 0 | Status: SHIPPED | OUTPATIENT
Start: 2024-10-28

## 2024-10-28 RX ORDER — LANCETS
EACH MISCELLANEOUS
Qty: 102 EACH | Refills: 3 | Status: SHIPPED | OUTPATIENT
Start: 2024-10-28

## 2024-11-04 LAB
.: NORMAL
.: NORMAL

## 2024-11-05 LAB — HPV E6+E7 MRNA CVX QL NAA+PROBE: NEGATIVE

## 2024-11-25 ENCOUNTER — OFFICE VISIT (OUTPATIENT)
Dept: INTERNAL MEDICINE CLINIC | Facility: CLINIC | Age: 64
End: 2024-11-25
Payer: COMMERCIAL

## 2024-11-25 VITALS
TEMPERATURE: 97 F | DIASTOLIC BLOOD PRESSURE: 80 MMHG | SYSTOLIC BLOOD PRESSURE: 142 MMHG | WEIGHT: 214.19 LBS | BODY MASS INDEX: 39.41 KG/M2 | OXYGEN SATURATION: 95 % | RESPIRATION RATE: 14 BRPM | HEIGHT: 62 IN | HEART RATE: 81 BPM

## 2024-11-25 DIAGNOSIS — I10 PRIMARY HYPERTENSION: Primary | ICD-10-CM

## 2024-11-25 PROCEDURE — 99214 OFFICE O/P EST MOD 30 MIN: CPT | Performed by: NURSE PRACTITIONER

## 2024-11-25 RX ORDER — LOSARTAN POTASSIUM 50 MG/1
50 TABLET ORAL DAILY
Qty: 90 TABLET | Refills: 0 | Status: SHIPPED | OUTPATIENT
Start: 2024-11-25

## 2024-11-25 NOTE — PROGRESS NOTES
CHIEF COMPLAINT:     Chief Complaint   Patient presents with    Hypertension     1 month follow up    At home readings -   24 125/77 and 114/80, before food or coffee  11/15/24 127/73 and 133/79, minutes apart     Cough went away after switching medications       HPI:   Jennie Bullock is a 63 year old female coming in for BP check.    Her cough is gone since switching from lisinopril to losartan. BP has been anywhere from 120-140/80-90 at home. She brought her home machine. BP readings reviewed on her phone. Denies any headaches, chest pain, shortness of breath, or palpitations. BP slightly elevated today in office.     Past Medical History:    Asthma ([facility])    Atypical chest pain    Bursitis of hip, right    Cataract    Age related both eyes    Chronic sinusitis    Dermatitis    at waist    Diabetes ([facility])    Dyspnea    secondary to PND    Eye hemorrhage    L subconjunctivial    Frequent urination    GERD (gastroesophageal reflux disease)    Gestational diabetes ([facility])    diet controlled    Heel pain    Hemorrhoids    swelling, occ. bleeding    High cholesterol    Hyperlipidemia    IGT (impaired glucose tolerance)    Infertility associated with anovulation    Lipid screening    Menometrorrhagia    Metabolic syndrome    Muscle spasm    shoulder    Obese    Obesity    LILLI (obstructive sleep apnea)    Mild/positional  AHI 7 ESC    Paresthesia    B/L great toes    Pelvic pain complicating pregnancy (HCC)    x3mo.     Recurrent herpes labialis    Nasal, after URIs    Rib pain on left side    under rib    Sprained ankle    Viral URI with cough    resolving    Wears glasses      Past Surgical History:   Procedure Laterality Date    Ankle fracture surgery Right 2022      10/11/1999    Colonoscopy      2019    Eye surgery  1981    minor      10/11/1999     [facility]    Other surgical history  1998    myomectomy      Social History:  Social History     Socioeconomic History     Marital status:    Tobacco Use    Smoking status: Never     Passive exposure: Never    Smokeless tobacco: Never   Vaping Use    Vaping status: Never Used   Substance and Sexual Activity    Alcohol use: Not Currently     Comment: very rare. Only at special events.    Drug use: Never    Sexual activity: Yes     Partners: Male   Other Topics Concern    Caffeine Concern Yes     Comment: 1-2 cups coffee daily    Stress Concern No    Weight Concern Yes     Comment: Weight loss Clinic    Special Diet No    Exercise Yes     Comment: 2023 physical therapy    Seat Belt Yes      Family History:  Family History   Problem Relation Age of Onset    Heart Attack Father         50's    Diabetes Father         Type 2    Glaucoma Maternal Grandmother     Other (Other) Maternal Grandmother     High Blood Pressure Mother     Heart Attack Brother 40    Glaucoma Maternal Uncle     Diabetes Brother         Type 2      Allergies:  Allergies[1]   Current Meds:  Current Outpatient Medications   Medication Sig Dispense Refill    losartan 50 MG Oral Tab Take 1 tablet (50 mg total) by mouth daily. 90 tablet 0    Accu-Chek FastClix Lancets Does not apply Misc Check once daily 102 each 3    ATORVASTATIN 20 MG Oral Tab TAKE 1 TABLET(20 MG) BY MOUTH DAILY 90 tablet 0    metFORMIN  MG Oral Tablet 24 Hr Take 2 tablets (1,000 mg total) by mouth 2 (two) times daily with meals. 360 tablet 0    psyllium 28 % Oral Powd Pack Take 1 packet by mouth as needed.      NON FORMULARY Viviscal tablets. Take one daily      VALACYCLOVIR 1 G Oral Tab TAKE 1 TABLET BY MOUTH EVERY 12 HOURS 2 tablet 1    Blood Glucose Monitoring Suppl (ACCU-CHEK GUIDE) w/Device Does not apply Kit Use as directed daily. 1 kit 0    Cholecalciferol (VITAMIN D3) 75 MCG (3000 UT) Oral Tab Take 1 tablet by mouth daily.         Counseling given: Not Answered       REVIEW OF SYSTEMS:   See HPI.    EXAM:     /80   Pulse 81   Temp 97.3 °F (36.3 °C) (Temporal)   Resp 14   Ht  5' 2\" (1.575 m)   Wt 214 lb 3.2 oz (97.2 kg)   SpO2 95%   BMI 39.18 kg/m²   Body mass index is 39.18 kg/m².   Vital signs reviewed. Appears stated age, well groomed, in no acute distress.  Physical Exam:  GENERAL: Patient is alert, awake and oriented, well developed, well nourished.  HEENT: Head: Normocephalic, atraumatic.  HEART: RRR without murmur.  LUNGS: Clear to auscultation bilaterally, no rales/rhonchi/wheezing.  ABDOMEN: good BS's, no masses, HSM or tenderness  MUSCULOSKELETAL: No obvious joint deformity or swelling.   EXTREMITIES: No edema, no cyanosis, no clubbing.  NEURO: Oriented time three.     LABS:      Lab Results   Component Value Date    WBC 8.3 10/02/2024    RBC 4.44 10/02/2024    HGB 13.7 10/02/2024    HCT 41.4 10/02/2024    MCV 93.2 10/02/2024    MCH 30.9 10/02/2024    MCHC 33.1 10/02/2024    RDW 12.4 10/02/2024    .0 10/02/2024      Lab Results   Component Value Date    GLU 93 10/02/2024    BUN 14 10/02/2024    BUNCREA SEE NOTE: 10/10/2023    CREATSERUM 0.59 10/02/2024    ANIONGAP 5 10/02/2024    GFR 99 04/28/2017    GFRNAA 101 05/02/2023    GFRAA 110 02/14/2022    CA 10.3 10/02/2024    OSMOCALC 286 10/02/2024    ALKPHO 103 10/02/2024    AST 21 10/02/2024    ALT 30 10/02/2024    BILT 1.0 10/02/2024    TP 7.3 10/02/2024    ALB 4.3 10/02/2024    GLOBULIN 3.0 10/02/2024    AGRATIO 1.4 10/10/2023     10/02/2024    K 3.9 10/02/2024     10/02/2024    CO2 28.0 10/02/2024      Lab Results   Component Value Date    CHOLEST 160 10/02/2024    TRIG 116 10/02/2024    HDL 55 10/02/2024    LDL 84 10/02/2024    VLDL 18 10/02/2024    TCHDLRATIO 2.6 10/10/2023    NONHDLC 105 10/02/2024      Lab Results   Component Value Date    TSH 1.555 10/02/2024    TSHT4 1.70 10/10/2023      Lab Results   Component Value Date     10/02/2024    A1C 6.0 (H) 10/02/2024        IMAGING:     No results found.     ASSESSMENT AND PLAN:   1. Primary hypertension  -BP elevated  -Increase Losartan to 50 mg  daily  -Low salt diet  -Continue monitoring BP at home and record in log  - losartan 50 MG Oral Tab; Take 1 tablet (50 mg total) by mouth daily.  Dispense: 90 tablet; Refill: 0     The patient indicates understanding of these issues and agrees to the plan.  Return in about 1 month (around 12/25/2024) for bp check.    Negar Love, EARLE  11/25/2024         [1]   Allergies  Allergen Reactions    Lisinopril Coughing    Seasonal Coughing and OTHER (SEE COMMENTS)

## 2024-12-10 DIAGNOSIS — E11.9 TYPE 2 DIABETES MELLITUS WITHOUT COMPLICATION, WITHOUT LONG-TERM CURRENT USE OF INSULIN (HCC): ICD-10-CM

## 2024-12-11 DIAGNOSIS — E11.9 TYPE 2 DIABETES MELLITUS WITHOUT COMPLICATION, WITHOUT LONG-TERM CURRENT USE OF INSULIN (HCC): ICD-10-CM

## 2024-12-11 RX ORDER — METFORMIN HYDROCHLORIDE 500 MG/1
1000 TABLET, EXTENDED RELEASE ORAL 2 TIMES DAILY WITH MEALS
Qty: 360 TABLET | Refills: 0 | Status: SHIPPED | OUTPATIENT
Start: 2024-12-11

## 2024-12-11 RX ORDER — METFORMIN HYDROCHLORIDE 500 MG/1
1000 TABLET, EXTENDED RELEASE ORAL 2 TIMES DAILY WITH MEALS
Qty: 360 TABLET | Refills: 0 | OUTPATIENT
Start: 2024-12-11

## 2024-12-11 NOTE — TELEPHONE ENCOUNTER
Diabetes Medication Protocol [id number]/11/2024 09:40 AM   Protocol Details Last A1C < 7.5 and within past 6 months    In person appointment or virtual visit in the past 6 mos or appointment in next 3 mos    Microalbumin procedure in past 12 months or taking ACE/ARB    EGFRCR or GFRNAA > 50    GFR in the past 12 months      Type 2 diabetes mellitus without complication, without long-term current use of insulin (HCC)  -Continue Metformin, advised to hold on the day of surgery. Can resume once able to eat  -Low carb diet and regular exercise as tolerated  Future Appointments   Date Time Provider Department Center   1/7/2025 11:20 AM Negar Love APRN [facility]

## 2025-01-07 ENCOUNTER — OFFICE VISIT (OUTPATIENT)
Dept: INTERNAL MEDICINE CLINIC | Facility: CLINIC | Age: 65
End: 2025-01-07
Payer: COMMERCIAL

## 2025-01-07 VITALS
HEIGHT: 62 IN | BODY MASS INDEX: 39.9 KG/M2 | WEIGHT: 216.81 LBS | OXYGEN SATURATION: 98 % | TEMPERATURE: 98 F | DIASTOLIC BLOOD PRESSURE: 88 MMHG | SYSTOLIC BLOOD PRESSURE: 130 MMHG | HEART RATE: 83 BPM | RESPIRATION RATE: 16 BRPM

## 2025-01-07 DIAGNOSIS — E11.9 TYPE 2 DIABETES MELLITUS WITHOUT COMPLICATION, WITHOUT LONG-TERM CURRENT USE OF INSULIN (HCC): ICD-10-CM

## 2025-01-07 DIAGNOSIS — I10 PRIMARY HYPERTENSION: Primary | ICD-10-CM

## 2025-01-07 DIAGNOSIS — E78.00 PURE HYPERCHOLESTEROLEMIA: ICD-10-CM

## 2025-01-07 PROCEDURE — 99214 OFFICE O/P EST MOD 30 MIN: CPT | Performed by: NURSE PRACTITIONER

## 2025-01-07 NOTE — PROGRESS NOTES
CHIEF COMPLAINT:     Chief Complaint   Patient presents with    Medication Follow-Up     1 month follow up    Hypertension     1 month follow up - has some home readings on her monitor that she brought with today       HPI:   Jennie Bullock is a 64 year old female coming in for BP check.    BP is normal today. Reports it is around 120/70s at home. No chest pain, shortness of breath, headaches, or vision changes. She is tolerating losartan well.     Past Medical History:    Allergic rhinitis    Occasional dry cough, running nose, watery eyes    Asthma ([facility])    Atypical chest pain    Bursitis of hip, right    Cataract    Age related both eyes    Chronic sinusitis    Dermatitis    at waist    Diabetes ([facility])    Dyspnea    secondary to PND    Essential hypertension    Eye hemorrhage    L subconjunctivial    Frequent urination    GERD (gastroesophageal reflux disease)    Gestational diabetes ([facility])    diet controlled    Heel pain    Hemorrhoids    swelling, occ. bleeding    High cholesterol    Hyperlipidemia    IGT (impaired glucose tolerance)    Infertility associated with anovulation    Lipid screening    Menometrorrhagia    Metabolic syndrome    Muscle spasm    shoulder    Obese    Obesity    LILLI (obstructive sleep apnea)    Mild/positional  AHI 7 ESC    Paresthesia    B/L great toes    Pelvic pain complicating pregnancy ([facility])    x3mo.     Recurrent herpes labialis    Nasal, after URIs    Rib pain on left side    under rib    Sprained ankle    Viral URI with cough    resolving    Wears glasses      Past Surgical History:   Procedure Laterality Date    Ankle fracture surgery Right 2022      10/11/1999    Cataract  2024    Right eye 2024, left eye 2024    Colonoscopy      2019    Eye surgery  1981    minor      10/11/1999     [facility]    Other surgical history  1998    myomectomy      Social History:  Social History     Socioeconomic History    Marital status:     Tobacco Use    Smoking status: Never     Passive exposure: Never    Smokeless tobacco: Never   Vaping Use    Vaping status: Never Used   Substance and Sexual Activity    Alcohol use: Yes     Comment: Not often    Drug use: Never    Sexual activity: Yes     Partners: Male   Other Topics Concern    Caffeine Concern Yes     Comment: 1-2 cups coffee daily    Stress Concern No    Weight Concern Yes    Special Diet No    Exercise No    Seat Belt Yes      Family History:  Family History   Problem Relation Age of Onset    Heart Attack Father         50's    Diabetes Father         Type 2    Glaucoma Maternal Grandmother     Other (Other) Maternal Grandmother     High Blood Pressure Mother     Heart Attack Brother 40    Glaucoma Maternal Uncle     Diabetes Brother         Type 2      Allergies:  Allergies[1]   Current Meds:  Current Outpatient Medications   Medication Sig Dispense Refill    metFORMIN  MG Oral Tablet 24 Hr Take 2 tablets (1,000 mg total) by mouth 2 (two) times daily with meals. 360 tablet 0    losartan 50 MG Oral Tab Take 1 tablet (50 mg total) by mouth daily. 90 tablet 0    Accu-Chek FastClix Lancets Does not apply Misc Check once daily 102 each 3    ATORVASTATIN 20 MG Oral Tab TAKE 1 TABLET(20 MG) BY MOUTH DAILY 90 tablet 0    psyllium 28 % Oral Powd Pack Take 1 packet by mouth as needed.      NON FORMULARY Viviscal tablets. Take one daily      VALACYCLOVIR 1 G Oral Tab TAKE 1 TABLET BY MOUTH EVERY 12 HOURS 2 tablet 1    Blood Glucose Monitoring Suppl (ACCU-CHEK GUIDE) w/Device Does not apply Kit Use as directed daily. 1 kit 0    Cholecalciferol (VITAMIN D3) 75 MCG (3000 UT) Oral Tab Take 1 tablet by mouth daily.         Counseling given: Not Answered       REVIEW OF SYSTEMS:   See HPI.    EXAM:     /88 (BP Location: Left arm, Patient Position: Sitting, Cuff Size: adult)   Pulse 83   Temp 97.8 °F (36.6 °C) (Temporal)   Resp 16   Ht 5' 2\" (1.575 m)   Wt 216 lb 12.8 oz (98.3 kg)    SpO2 98%   BMI 39.65 kg/m²   Body mass index is 39.65 kg/m².   Vital signs reviewed. Appears stated age, well groomed, in no acute distress.  Physical Exam:  GENERAL: Patient is alert, awake and oriented, well developed, well nourished.  HEENT: Head: Normocephalic, atraumatic.   HEART: RRR without murmur.  LUNGS: Clear to auscultation bilaterally, no rales/rhonchi/wheezing.  ABDOMEN: good BS's, no masses, HSM or tenderness  MUSCULOSKELETAL: No obvious joint deformity or swelling.   EXTREMITIES: No edema, no cyanosis, no clubbing  NEURO: Oriented time three.     LABS:      Lab Results   Component Value Date    WBC 8.3 10/02/2024    RBC 4.44 10/02/2024    HGB 13.7 10/02/2024    HCT 41.4 10/02/2024    MCV 93.2 10/02/2024    MCH 30.9 10/02/2024    MCHC 33.1 10/02/2024    RDW 12.4 10/02/2024    .0 10/02/2024      Lab Results   Component Value Date    GLU 93 10/02/2024    BUN 14 10/02/2024    BUNCREA SEE NOTE: 10/10/2023    CREATSERUM 0.59 10/02/2024    ANIONGAP 5 10/02/2024    GFR 99 04/28/2017    GFRNAA 101 05/02/2023    GFRAA 110 02/14/2022    CA 10.3 10/02/2024    OSMOCALC 286 10/02/2024    ALKPHO 103 10/02/2024    AST 21 10/02/2024    ALT 30 10/02/2024    BILT 1.0 10/02/2024    TP 7.3 10/02/2024    ALB 4.3 10/02/2024    GLOBULIN 3.0 10/02/2024    AGRATIO 1.4 10/10/2023     10/02/2024    K 3.9 10/02/2024     10/02/2024    CO2 28.0 10/02/2024      Lab Results   Component Value Date    CHOLEST 160 10/02/2024    TRIG 116 10/02/2024    HDL 55 10/02/2024    LDL 84 10/02/2024    VLDL 18 10/02/2024    TCHDLRATIO 2.6 10/10/2023    NONHDLC 105 10/02/2024      Lab Results   Component Value Date    TSH 1.555 10/02/2024    TSHT4 1.70 10/10/2023      Lab Results   Component Value Date     10/02/2024    A1C 6.0 (H) 10/02/2024        IMAGING:     No results found.     ASSESSMENT AND PLAN:   1. Primary hypertension  -BP stable, continue losartan 50 mg daily  -Low salt diet  - Comp Metabolic Panel (14) [E];  Future    2. Type 2 diabetes mellitus without complication, without long-term current use of insulin (HCC)  -Do labs prior to next visit   -Continue metformin  -Foot exam to be done at next visit  -Eye exam UTD, due in September  -UTD with microalbumin, due in October   - Comp Metabolic Panel (14) [E]; Future  - Hemoglobin A1C [E]; Future    3. Pure hypercholesterolemia  -Continue statin  - Lipid Panel [E]; Future     The patient indicates understanding of these issues and agrees to the plan.  Return in about 3 months (around 4/7/2025) for med check.    Negar Love, APRN  1/7/2025         [1]   Allergies  Allergen Reactions    Lisinopril Coughing

## 2025-02-05 DIAGNOSIS — E78.00 PURE HYPERCHOLESTEROLEMIA: ICD-10-CM

## 2025-02-06 RX ORDER — ATORVASTATIN CALCIUM 20 MG/1
20 TABLET, FILM COATED ORAL DAILY
Qty: 90 TABLET | Refills: 0 | Status: SHIPPED | OUTPATIENT
Start: 2025-02-06

## 2025-02-06 NOTE — TELEPHONE ENCOUNTER
Cholesterol Medication Protocol Aooxti2002/05/2025 07:00 PM   Protocol Details ALT < 80    ALT resulted within past year    Lipid panel within past 12 months    In person appointment or virtual visit in the past 12 mos or appointment in next 3 mos    Medication is active on med list

## 2025-02-24 DIAGNOSIS — E11.9 TYPE 2 DIABETES MELLITUS WITHOUT COMPLICATION, WITHOUT LONG-TERM CURRENT USE OF INSULIN (HCC): ICD-10-CM

## 2025-02-26 RX ORDER — METFORMIN HYDROCHLORIDE 500 MG/1
1000 TABLET, EXTENDED RELEASE ORAL 2 TIMES DAILY WITH MEALS
Qty: 360 TABLET | Refills: 0 | Status: SHIPPED | OUTPATIENT
Start: 2025-02-26

## 2025-02-26 NOTE — TELEPHONE ENCOUNTER
Diabetes Medication Protocol Uwffsh6802/24/2025 02:02 PM   Protocol Details Last A1C < 7.5 and within past 6 months    In person appointment or virtual visit in the past 6 mos or appointment in next 3 mos    Microalbumin procedure in past 12 months or taking ACE/ARB    EGFRCR or GFRNAA > 50    GFR in the past 12 months    Medication is active on med list   2. Type 2 diabetes mellitus without complication, without long-term current use of insulin (HCC)  -Do labs prior to next visit   -Continue metformin  -Foot exam to be done at next visit  No future appointments.  Return in about 3 months (around 4/7/2025) for med check.

## 2025-03-19 DIAGNOSIS — I10 PRIMARY HYPERTENSION: ICD-10-CM

## 2025-03-20 DIAGNOSIS — I10 PRIMARY HYPERTENSION: ICD-10-CM

## 2025-03-21 RX ORDER — LOSARTAN POTASSIUM 50 MG/1
50 TABLET ORAL DAILY
Qty: 90 TABLET | Refills: 0 | OUTPATIENT
Start: 2025-03-21

## 2025-03-21 RX ORDER — LOSARTAN POTASSIUM 50 MG/1
50 TABLET ORAL DAILY
Qty: 90 TABLET | Refills: 0 | Status: SHIPPED | OUTPATIENT
Start: 2025-03-21

## 2025-03-21 NOTE — TELEPHONE ENCOUNTER
Hypertension Medications Protocol Eybyuu0103/20/2025 10:25 AM   Protocol Details CMP or BMP in past 12 months    Last BP reading less than 140/90    In person appointment or virtual visit in the past 12 mos or appointment in next 3 mos    EGFRCR or GFRNAA > 50    Medication is active on med list      1. Primary hypertension  -BP stable, continue losartan 50 mg daily  -Low salt diet  Future Appointments   Date Time Provider Department Center   4/1/2025  8:30 AM [facility] [address]   4/4/2025  9:00 AM Eunice Shepherd MD [facility] [address] [facility] [address]

## 2025-04-01 ENCOUNTER — LAB ENCOUNTER (OUTPATIENT)
Dept: LAB | Age: 65
End: 2025-04-01
Attending: NURSE PRACTITIONER
Payer: COMMERCIAL

## 2025-04-01 DIAGNOSIS — E78.00 PURE HYPERCHOLESTEROLEMIA: ICD-10-CM

## 2025-04-01 DIAGNOSIS — E11.9 TYPE 2 DIABETES MELLITUS WITHOUT COMPLICATION, WITHOUT LONG-TERM CURRENT USE OF INSULIN (HCC): ICD-10-CM

## 2025-04-01 DIAGNOSIS — I10 PRIMARY HYPERTENSION: ICD-10-CM

## 2025-04-01 LAB
ALBUMIN SERPL-MCNC: 4.7 G/DL (ref 3.2–4.8)
ALBUMIN/GLOB SERPL: 1.7 {RATIO} (ref 1–2)
ALP LIVER SERPL-CCNC: 97 U/L
ALT SERPL-CCNC: 26 U/L
ANION GAP SERPL CALC-SCNC: 10 MMOL/L (ref 0–18)
AST SERPL-CCNC: 19 U/L (ref ?–34)
BILIRUB SERPL-MCNC: 1 MG/DL (ref 0.2–1.1)
BUN BLD-MCNC: 16 MG/DL (ref 9–23)
CALCIUM BLD-MCNC: 10.2 MG/DL (ref 8.7–10.6)
CHLORIDE SERPL-SCNC: 104 MMOL/L (ref 98–112)
CHOLEST SERPL-MCNC: 159 MG/DL (ref ?–200)
CO2 SERPL-SCNC: 30 MMOL/L (ref 21–32)
CREAT BLD-MCNC: 0.63 MG/DL
EGFRCR SERPLBLD CKD-EPI 2021: 99 ML/MIN/1.73M2 (ref 60–?)
EST. AVERAGE GLUCOSE BLD GHB EST-MCNC: 128 MG/DL (ref 68–126)
FASTING PATIENT LIPID ANSWER: YES
FASTING STATUS PATIENT QL REPORTED: YES
GLOBULIN PLAS-MCNC: 2.7 G/DL (ref 2–3.5)
GLUCOSE BLD-MCNC: 98 MG/DL (ref 70–99)
HBA1C MFR BLD: 6.1 % (ref ?–5.7)
HDLC SERPL-MCNC: 60 MG/DL (ref 40–59)
LDLC SERPL CALC-MCNC: 78 MG/DL (ref ?–100)
NONHDLC SERPL-MCNC: 99 MG/DL (ref ?–130)
OSMOLALITY SERPL CALC.SUM OF ELEC: 299 MOSM/KG (ref 275–295)
POTASSIUM SERPL-SCNC: 4.1 MMOL/L (ref 3.5–5.1)
PROT SERPL-MCNC: 7.4 G/DL (ref 5.7–8.2)
SODIUM SERPL-SCNC: 144 MMOL/L (ref 136–145)
TRIGL SERPL-MCNC: 118 MG/DL (ref 30–149)
VLDLC SERPL CALC-MCNC: 18 MG/DL (ref 0–30)

## 2025-04-01 PROCEDURE — 80053 COMPREHEN METABOLIC PANEL: CPT

## 2025-04-01 PROCEDURE — 83036 HEMOGLOBIN GLYCOSYLATED A1C: CPT

## 2025-04-01 PROCEDURE — 36415 COLL VENOUS BLD VENIPUNCTURE: CPT

## 2025-04-01 PROCEDURE — 80061 LIPID PANEL: CPT

## 2025-04-04 ENCOUNTER — OFFICE VISIT (OUTPATIENT)
Dept: INTERNAL MEDICINE CLINIC | Facility: CLINIC | Age: 65
End: 2025-04-04
Payer: COMMERCIAL

## 2025-04-04 VITALS
DIASTOLIC BLOOD PRESSURE: 80 MMHG | HEIGHT: 62 IN | WEIGHT: 213.81 LBS | HEART RATE: 84 BPM | RESPIRATION RATE: 16 BRPM | SYSTOLIC BLOOD PRESSURE: 138 MMHG | BODY MASS INDEX: 39.34 KG/M2 | TEMPERATURE: 97 F

## 2025-04-04 DIAGNOSIS — G47.33 OSA (OBSTRUCTIVE SLEEP APNEA): ICD-10-CM

## 2025-04-04 DIAGNOSIS — E78.00 PURE HYPERCHOLESTEROLEMIA: ICD-10-CM

## 2025-04-04 DIAGNOSIS — E66.812 CLASS 2 SEVERE OBESITY DUE TO EXCESS CALORIES WITH SERIOUS COMORBIDITY AND BODY MASS INDEX (BMI) OF 39.0 TO 39.9 IN ADULT (HCC): ICD-10-CM

## 2025-04-04 DIAGNOSIS — E11.9 TYPE 2 DIABETES MELLITUS WITHOUT COMPLICATION, WITHOUT LONG-TERM CURRENT USE OF INSULIN (HCC): ICD-10-CM

## 2025-04-04 DIAGNOSIS — I10 PRIMARY HYPERTENSION: Primary | ICD-10-CM

## 2025-04-04 DIAGNOSIS — Z00.00 PHYSICAL EXAM, ANNUAL: ICD-10-CM

## 2025-04-04 DIAGNOSIS — E66.01 CLASS 2 SEVERE OBESITY DUE TO EXCESS CALORIES WITH SERIOUS COMORBIDITY AND BODY MASS INDEX (BMI) OF 39.0 TO 39.9 IN ADULT (HCC): ICD-10-CM

## 2025-04-04 NOTE — PROGRESS NOTES
[facility]    CHIEF COMPLAINT:    Chief Complaint   Patient presents with    Medication Follow-Up     10/28/24-pap. 9/24/24-mammo. 9/9/19-colon-repeat 10 years 9/9/24-eye exam. Form given. 4/26/24-foot exam.          HISTORY OF PRESENT ILLNESS:  here for med check.   Dm: currently diet controlled. A1c 6.1. she was on mounjaro before but too expensive, insurance did not cover.   Eye exam done 9/2024.   Foot exam due.      Hyperlipidemia: on statin. No muscle aches.  LDL at goal.     Htn: Taking meds regularly. No chest pain, no shortness of breath.      Joselito: was mild. She was told to lose weight. She is working on this. No cpap.      history of cold sores. Gets it when she gets a cold. Use valtrex prn episodically.     REVIEW OF SYSTEMS:  See HPI    Current Medications:    Current Outpatient Medications   Medication Sig Dispense Refill    losartan 50 MG Oral Tab Take 1 tablet (50 mg total) by mouth daily. 90 tablet 0    METFORMIN  MG Oral Tablet 24 Hr TAKE 2 TABLETS(1000 MG) BY MOUTH TWICE DAILY WITH MEALS 360 tablet 0    ATORVASTATIN 20 MG Oral Tab TAKE 1 TABLET(20 MG) BY MOUTH DAILY 90 tablet 0    Accu-Chek FastClix Lancets Does not apply Misc Check once daily 102 each 3    psyllium 28 % Oral Powd Pack Take 1 packet by mouth as needed.      NON FORMULARY Viviscal tablets. Take one daily      VALACYCLOVIR 1 G Oral Tab TAKE 1 TABLET BY MOUTH EVERY 12 HOURS (Patient taking differently: Take 1 tablet (1,000 mg total) by mouth Q12H. prn) 2 tablet 1    Blood Glucose Monitoring Suppl (ACCU-CHEK GUIDE) w/Device Does not apply Kit Use as directed daily. 1 kit 0    Cholecalciferol (VITAMIN D3) 75 MCG (3000 UT) Oral Tab Take 1 tablet by mouth daily.         PAST MEDICAL, SOCIAL, AND FAMILY HISTORY:  Tobacco:    History   Smoking Status    Never   Smokeless Tobacco    Never       PHYSICAL EXAM:  /80 (BP Location: Left arm, Patient Position: Sitting, Cuff Size: large)   Pulse 84   Temp 97.2 °F (36.2 °C)  (Temporal)   Resp 16   Ht 5' 2\" (1.575 m)   Wt 213 lb 12.8 oz (97 kg)   Breastfeeding No   BMI 39.10 kg/m²    GENERAL: well developed, well nourished,in no apparent distress  LUNGS: clear to auscultation  CARDIO: RRR without murmur  GI: good BS's,no masses, HSM or tenderness  MUSCULOSKELETAL:  no edema, muscle strength normal.   Bilateral barefoot skin diabetic exam is normal, visualized feet and the appearance is normal.  Bilateral monofilament/sensation of both feet is normal.  Pulsation pedal pulse exam of both lower legs/feet is normal as well.       DATA:  Results for orders placed or performed in visit on 04/01/25   Comp Metabolic Panel (14) [E]    Collection Time: 04/01/25  8:25 AM   Result Value Ref Range    Glucose 98 70 - 99 mg/dL    Sodium 144 136 - 145 mmol/L    Potassium 4.1 3.5 - 5.1 mmol/L    Chloride 104 98 - 112 mmol/L    CO2 30.0 21.0 - 32.0 mmol/L    Anion Gap 10 0 - 18 mmol/L    BUN 16 9 - 23 mg/dL    Creatinine 0.63 0.55 - 1.02 mg/dL    Calcium, Total 10.2 8.7 - 10.6 mg/dL    Calculated Osmolality 299 (H) 275 - 295 mOsm/kg    eGFR-Cr 99 >=60 mL/min/1.73m2    AST 19 <34 U/L    ALT 26 10 - 49 U/L    Alkaline Phosphatase 97 50 - 130 U/L    Bilirubin, Total 1.0 0.2 - 1.1 mg/dL    Total Protein 7.4 5.7 - 8.2 g/dL    Albumin 4.7 3.2 - 4.8 g/dL    Globulin  2.7 2.0 - 3.5 g/dL    A/G Ratio 1.7 1.0 - 2.0    Patient Fasting for CMP? Yes    Hemoglobin A1C [E]    Collection Time: 04/01/25  8:25 AM   Result Value Ref Range    HgbA1C 6.1 (H) <5.7 %    Estimated Average Glucose 128 (H) 68 - 126 mg/dL   Lipid Panel [E]    Collection Time: 04/01/25  8:25 AM   Result Value Ref Range    Cholesterol, Total 159 <200 mg/dL    HDL Cholesterol 60 (H) 40 - 59 mg/dL    Triglycerides 118 30 - 149 mg/dL    LDL Cholesterol 78 <100 mg/dL    VLDL 18 0 - 30 mg/dL    Non HDL Chol 99 <130 mg/dL    Patient Fasting for Lipid? Yes           ASSESSMENT AND PLAN:  1. Primary hypertension  Continue losartan.   - TSH W Reflex To  Free T4; Future    2. Pure hypercholesterolemia  Continue atorvastatin.   - Comp Metabolic Panel; Future  - Lipid Panel; Future    3. Type 2 diabetes mellitus without complication, without long-term current use of insulin ([facility])  Continue metformin er 1000mg bid.   She has been taking the am meds after breakfast as taking it before breakfast gives her too much diarrhea. Better if she takes it after breakfast. Does not have symptoms if she takes it in the pm.   Discussed that this is an er medication and if she is able to tolerate 2000mg once a day in the pm that is okay to do. She will try it out and decide.   Labs prior to next visit.   - Hemoglobin A1C; Future  - Microalb/Creat Ratio, Random Urine [E]; Future    4. Physical exam, annual  - CBC With Differential With Platelet; Future  - Comp Metabolic Panel; Future  - Lipid Panel; Future  - Hemoglobin A1C; Future  - TSH W Reflex To Free T4; Future    5. LILLI (obstructive sleep apnea)  Not on cpap.     6. Class 2 severe obesity due to excess calories with serious comorbidity and body mass index (BMI) of 39.0 to 39.9 in adult ([facility])  Working on weight loss.         Return in about 6 months (around 10/4/2025) for physical.      Eunice Shepherd MD

## 2025-05-13 DIAGNOSIS — E78.00 PURE HYPERCHOLESTEROLEMIA: ICD-10-CM

## 2025-05-14 RX ORDER — ATORVASTATIN CALCIUM 20 MG/1
20 TABLET, FILM COATED ORAL DAILY
Qty: 90 TABLET | Refills: 1 | Status: SHIPPED | OUTPATIENT
Start: 2025-05-14

## 2025-05-14 NOTE — TELEPHONE ENCOUNTER
Cholesterol Medication Protocol Xnbzot1605/13/2025 04:22 PM   Protocol Details ALT < 80    ALT resulted within past year    Lipid panel within past 12 months    In person appointment or virtual visit in the past 12 mos or appointment in next 3 mos    Medication is active on med list          No future appointments.

## 2025-05-23 DIAGNOSIS — E11.9 TYPE 2 DIABETES MELLITUS WITHOUT COMPLICATION, WITHOUT LONG-TERM CURRENT USE OF INSULIN (HCC): ICD-10-CM

## 2025-05-23 RX ORDER — METFORMIN HYDROCHLORIDE 500 MG/1
1000 TABLET, EXTENDED RELEASE ORAL 2 TIMES DAILY WITH MEALS
Qty: 360 TABLET | Refills: 1 | Status: SHIPPED | OUTPATIENT
Start: 2025-05-23

## 2025-05-23 NOTE — TELEPHONE ENCOUNTER
Diabetes Medication Protocol Clmxqh7205/23/2025 12:54 PM   Protocol Details Last A1C < 7.5 and within past 6 months    In person appointment or virtual visit in the past 6 mos or appointment in next 3 mos    Microalbumin procedure in past 12 months or taking ACE/ARB    EGFRCR or GFRNAA > 50    GFR in the past 12 months    Medication is active on med list

## (undated) NOTE — LETTER
09/27/17        [address]  [address] [facility] [address]      Dear Azam العراقي,    [address] [facility] records indicate that you have outstanding lab work and or testing that was ordered for you and has not yet been completed:          Lipid Panel     To pro

## (undated) NOTE — LETTER
Diabetes Retinal Eye Examination Report    Patient Name: Jennie Bullock                                        : 1960    Referring Physician: Eunice Shepherd MD  _____________________________________________________________________________  Patient - Please bring this form to your next eye examination appointment.   Give it to your eye care professional to fill out and return via fax to your primary care provider.     Eye Care Professional - Please fill out this form and fax it back to the referring  primary care physician.   _____________________________________________________________________________     Date of Exam: ___/___/___    The patient received a dilated fundus examination with the following results:    ___ No diabetic retinopathy detected  ___ Background retinopathy was detected, but only requires monitoring  ___ Retinopathy requiring further testing and/or treatment was detected. See notes below.    Notes / Commentary / Recommendations:  _________________________________________________________________________________________________________________________________________________________________________________________________________________________________    The patient is to return for follow-up / evaluation in ____ months.    Eye Care Provider (Print): _______________________Signature___________________ Date ___ / ___/___    Clinic/Office name: _______________________Ph:_____________Fax:_____________

## (undated) NOTE — LETTER
3/16/2021    Dear Dr. Santos Mcdaniel would like to refer you Artur Bowser - : 1960    Referring Provider: EARLE Sanford    Fax: [phone]    As soon as the patient is seen please complete the form below and fax to the referring provider with

## (undated) NOTE — LETTER
Hermes Pederson, :1960    CONSENT FOR PROCEDURE/SEDATION    1. I authorize the performance upon Hermes Pederson  the following: Vulvar Biopsy    2.  I authorize Dr. Alissa Marie MD (and whomever is designated as the doctor’s assistant), to perform Witness: _________________________________________ Date:___________     Physician Signature: _______________________________ Date:___________

## (undated) NOTE — MR AVS SNAPSHOT
[address] [id number] [phone]               Thank you for choosing us for your health care visit with Disha Mitchell MD.  We are glad to serve you and happy to provid Today's Vital Signs     BP Pulse Temp Height Weight BMI    126/80 mmHg 86 97.9 °F (36.6 °C) (Oral) 61.25\" 219 lb 6.4 oz 41.10 kg/m2         Current Medications      Notice  As of 4/21/2017 11:21 AM    You have not been prescribed any medications. active are less likely to develop some chronic diseases than adults who are inactive.      HOW TO GET STARTED: HOW TO STAY MOTIVATED:   Start activities slowly and build up over time Do what you like   Get your heart pumping – brisk walking, biking, swimmin

## (undated) NOTE — LETTER
Requirements for Pre-Operative Clearance Requests  **All Fields Must Be Completed**    2024    Dear Surgeon,    We have been notified that your patient Jennie Bullock  1960, is scheduled for surgery and that you would like us to clear him/her for this procedure.  In order to comply with governmental coding requirements and in order to bill for our services, the following is required for us to be able to see this patient for pre-operative clearance.     Pre-op appointment is scheduled on 24 with OSKAR Bermudez    Comorbid diagnosis for which clearance is requested:          Surgical Diagnosis:            Procedure:           Surgeon:            Date of surgery:          Length of Surgery:      __________________________________________  Type of anesthesia:     __________________________________________  Location of surgery:           Phone:          Fax:         Completed pre-operative clearance should be faxed to:    Attention:          Phone:         Fax:         Check One:    ¨ Pre-op testing ordered by surgeon  ¨ Pre-op testing to be ordered by pre-admission testing  ¨ No pre-op testing needed    When this form is complete, please fax back to [phone]

## (undated) NOTE — LETTER
09/30/21    [address] [facility] [address]    Dear Gabriel Campuzano,    [address] [facility] records indicate that you have outstanding lab work. To schedule please call [employer] or visit their website.  Please request your results to be faxed to our o